# Patient Record
Sex: FEMALE | Race: WHITE | Employment: OTHER | URBAN - METROPOLITAN AREA
[De-identification: names, ages, dates, MRNs, and addresses within clinical notes are randomized per-mention and may not be internally consistent; named-entity substitution may affect disease eponyms.]

---

## 2017-06-07 ENCOUNTER — OFFICE VISIT (OUTPATIENT)
Dept: ORTHOPEDIC SURGERY | Age: 52
End: 2017-06-07

## 2017-06-07 VITALS — WEIGHT: 146 LBS | HEIGHT: 66 IN | BODY MASS INDEX: 23.46 KG/M2

## 2017-06-07 DIAGNOSIS — M17.11 ARTHRITIS OF RIGHT KNEE: Primary | ICD-10-CM

## 2017-06-07 DIAGNOSIS — S83.241A ACUTE MEDIAL MENISCAL TEAR, RIGHT, INITIAL ENCOUNTER: ICD-10-CM

## 2017-06-07 PROCEDURE — 99213 OFFICE O/P EST LOW 20 MIN: CPT | Performed by: ORTHOPAEDIC SURGERY

## 2017-06-21 ENCOUNTER — OFFICE VISIT (OUTPATIENT)
Dept: ORTHOPEDIC SURGERY | Age: 52
End: 2017-06-21

## 2017-06-21 VITALS — WEIGHT: 135 LBS | HEIGHT: 66 IN | BODY MASS INDEX: 21.69 KG/M2

## 2017-06-21 DIAGNOSIS — M17.11 PRIMARY OSTEOARTHRITIS OF RIGHT KNEE: Primary | ICD-10-CM

## 2017-06-21 PROCEDURE — 99213 OFFICE O/P EST LOW 20 MIN: CPT | Performed by: ORTHOPAEDIC SURGERY

## 2017-06-21 RX ORDER — CYCLOBENZAPRINE HCL 10 MG
TABLET ORAL
Refills: 3 | COMMUNITY
Start: 2017-06-14 | End: 2017-12-13

## 2017-06-21 RX ORDER — PROMETHAZINE HYDROCHLORIDE 25 MG/1
TABLET ORAL
Refills: 3 | COMMUNITY
Start: 2017-04-11

## 2017-06-21 RX ORDER — GABAPENTIN 300 MG/1
1200 CAPSULE ORAL NIGHTLY
COMMUNITY
Start: 2017-06-18 | End: 2018-12-10

## 2017-06-21 RX ORDER — DEXTROAMPHETAMINE SULFATE, DEXTROAMPHETAMINE SACCHARATE, AMPHETAMINE SULFATE AND AMPHETAMINE ASPARTATE 7.5; 7.5; 7.5; 7.5 MG/1; MG/1; MG/1; MG/1
CAPSULE, EXTENDED RELEASE ORAL
Refills: 0 | Status: ON HOLD | COMMUNITY
Start: 2017-05-22 | End: 2017-12-06

## 2017-06-21 RX ORDER — CELECOXIB 200 MG/1
CAPSULE ORAL
Refills: 3 | COMMUNITY
Start: 2017-06-09 | End: 2017-12-13

## 2017-06-30 ENCOUNTER — TELEPHONE (OUTPATIENT)
Dept: ORTHOPEDIC SURGERY | Age: 52
End: 2017-06-30

## 2017-07-06 ENCOUNTER — TELEPHONE (OUTPATIENT)
Dept: ORTHOPEDIC SURGERY | Age: 52
End: 2017-07-06

## 2017-07-21 ENCOUNTER — HOSPITAL ENCOUNTER (OUTPATIENT)
Dept: SURGERY | Age: 52
Discharge: HOME | End: 2017-07-21
Attending: INTERNAL MEDICINE | Admitting: INTERNAL MEDICINE

## 2017-07-21 VITALS
OXYGEN SATURATION: 99 % | BODY MASS INDEX: 22.5 KG/M2 | HEIGHT: 66 IN | RESPIRATION RATE: 18 BRPM | WEIGHT: 140 LBS | TEMPERATURE: 98.3 F | SYSTOLIC BLOOD PRESSURE: 99 MMHG | DIASTOLIC BLOOD PRESSURE: 78 MMHG | HEART RATE: 65 BPM

## 2017-07-21 DIAGNOSIS — K59.00 CONSTIPATION: ICD-10-CM

## 2017-07-21 RX ORDER — SODIUM CHLORIDE, SODIUM LACTATE, POTASSIUM CHLORIDE, CALCIUM CHLORIDE 600; 310; 30; 20 MG/100ML; MG/100ML; MG/100ML; MG/100ML
INJECTION, SOLUTION INTRAVENOUS ONCE
Status: COMPLETED | OUTPATIENT
Start: 2017-07-21 | End: 2017-07-21

## 2017-07-21 RX ORDER — PANTOPRAZOLE SODIUM 40 MG/1
40 TABLET, DELAYED RELEASE ORAL DAILY
COMMUNITY
End: 2017-12-13

## 2017-07-21 RX ORDER — LIDOCAINE HYDROCHLORIDE 10 MG/ML
0.1 INJECTION, SOLUTION EPIDURAL; INFILTRATION; INTRACAUDAL; PERINEURAL
Status: ACTIVE | OUTPATIENT
Start: 2017-07-21 | End: 2017-07-21

## 2017-07-21 RX ADMIN — SODIUM CHLORIDE, SODIUM LACTATE, POTASSIUM CHLORIDE, CALCIUM CHLORIDE: 600; 310; 30; 20 INJECTION, SOLUTION INTRAVENOUS at 09:59

## 2017-07-21 ASSESSMENT — PAIN SCALES - GENERAL: PAINLEVEL_OUTOF10: 0

## 2017-07-21 ASSESSMENT — PAIN - FUNCTIONAL ASSESSMENT: PAIN_FUNCTIONAL_ASSESSMENT: 0-10

## 2017-07-27 ENCOUNTER — NURSE ONLY (OUTPATIENT)
Dept: ORTHOPEDIC SURGERY | Age: 52
End: 2017-07-27

## 2017-07-27 VITALS — BODY MASS INDEX: 21.69 KG/M2 | WEIGHT: 135 LBS | HEIGHT: 66 IN

## 2017-07-27 DIAGNOSIS — M17.11 PRIMARY OSTEOARTHRITIS OF RIGHT KNEE: Primary | ICD-10-CM

## 2017-07-27 PROCEDURE — 20610 DRAIN/INJ JOINT/BURSA W/O US: CPT | Performed by: PHYSICIAN ASSISTANT

## 2017-08-24 ENCOUNTER — NURSE ONLY (OUTPATIENT)
Dept: ORTHOPEDIC SURGERY | Age: 52
End: 2017-08-24

## 2017-08-24 VITALS — HEIGHT: 66 IN | BODY MASS INDEX: 21.69 KG/M2 | WEIGHT: 135 LBS

## 2017-08-24 DIAGNOSIS — M17.11 PRIMARY OSTEOARTHRITIS OF RIGHT KNEE: Primary | ICD-10-CM

## 2017-08-25 ENCOUNTER — NURSE ONLY (OUTPATIENT)
Dept: ORTHOPEDIC SURGERY | Age: 52
End: 2017-08-25

## 2017-08-25 VITALS — WEIGHT: 135 LBS | BODY MASS INDEX: 21.69 KG/M2 | HEIGHT: 66 IN

## 2017-08-25 DIAGNOSIS — M17.11 PRIMARY OSTEOARTHRITIS OF RIGHT KNEE: Primary | ICD-10-CM

## 2017-08-25 PROCEDURE — 20610 DRAIN/INJ JOINT/BURSA W/O US: CPT | Performed by: PHYSICIAN ASSISTANT

## 2017-09-08 ENCOUNTER — NURSE ONLY (OUTPATIENT)
Dept: ORTHOPEDIC SURGERY | Age: 52
End: 2017-09-08

## 2017-09-08 VITALS — WEIGHT: 135 LBS | HEIGHT: 66 IN | BODY MASS INDEX: 21.69 KG/M2

## 2017-09-08 DIAGNOSIS — M17.11 PRIMARY OSTEOARTHRITIS OF RIGHT KNEE: Primary | ICD-10-CM

## 2017-09-08 PROCEDURE — 20610 DRAIN/INJ JOINT/BURSA W/O US: CPT | Performed by: PHYSICIAN ASSISTANT

## 2017-10-26 ENCOUNTER — OFFICE VISIT (OUTPATIENT)
Dept: ORTHOPEDIC SURGERY | Age: 52
End: 2017-10-26

## 2017-10-26 VITALS
DIASTOLIC BLOOD PRESSURE: 71 MMHG | HEIGHT: 66 IN | BODY MASS INDEX: 21.69 KG/M2 | SYSTOLIC BLOOD PRESSURE: 134 MMHG | WEIGHT: 135 LBS | HEART RATE: 84 BPM

## 2017-10-26 DIAGNOSIS — M17.11 PRIMARY OSTEOARTHRITIS OF RIGHT KNEE: Primary | ICD-10-CM

## 2017-10-26 PROCEDURE — 99213 OFFICE O/P EST LOW 20 MIN: CPT | Performed by: PHYSICIAN ASSISTANT

## 2017-10-26 PROCEDURE — 73564 X-RAY EXAM KNEE 4 OR MORE: CPT | Performed by: PHYSICIAN ASSISTANT

## 2017-10-26 NOTE — PROGRESS NOTES
Chief Complaint:  Knee Pain (RIGHT     FINISHED EUFLEXXA LAST VISIT)      History of Present Illness:  Nohemi Russell is a 46 y.o. female here regarding right knee pain. The patient is currently ambulating independently. The pain is located medial, lateral. She describes the symptoms as aching and sharp. Symptoms improve with nothing. The symptoms are made worse with activity, weight bearing. Patient reports she has been having problems with her knees for many years. She was seen in the office in Aug 2016 where she had her knee aspirated and injected with cortisone, which seems to help for a short amount of time, maybe a couple months. She continued to get pain and swelling. She currently takes Celebrex daily and will occasionally take Advil as well. She has tried OTC pain medication including tylenol with no relief. She reports her knee pain is now starting to keep her up at night. She does have a history of 2 knee arthroscopies in this right knee done by Dr. Francies Kayser. She recently finished a series 3 Euflex injections with essentially no relief. She has tried numerous conservative treatments including a directed home exercise program as well as some physical therapy in the past as well. She is not having any relief and is interested in replacing her knee at this point      Medical History:  Patient's medications, allergies, past medical, surgical, social and family histories were reviewed and updated as appropriate. Review of Systems:  Pertinent items are noted in HPI  Review of systems reviewed from Patient History Form and available in the patient's chart under the Media tab. Vital Signs:  Vitals:    10/26/17 1418   BP: 134/71   Pulse: 84       Pain Assessment:            General Exam:   Constitutional: Patient is adequately groomed with no evidence of malnutrition  Mental Status: The patient is oriented to time, place and person. The patient's mood and affect are appropriate.   Vascular:

## 2017-10-26 NOTE — LETTER
[facility]  Surgery Precert & Billing Form:    DEMOGRAPHICS:                                                                                                       Patient Name:  Regis Phalen  Patient :  1965   Patient SS#:      Patient Phone:  [phone] (home) [phone] (work) Alt.  Patient Phone:    Patient Address:  [facility] [address] [id number]    PCP:  Yue Chaudhary MD  Insurance: [facility]    DIAGNOSIS & PROCEDURE:                                                                                      Diagnosis: M17.11  Operation: right    SURGERY  INFORMATION  Date of Surgery:   17  Location:    [facility]  Type:    Inpatient  23 hour hold:  No  Surgeon:         Javi Bravo MD  10/26/17     BILLING INFORMATION:                                                                                                Physician Procedure                                            CPT Codes                      PA, or Fellow Procedure                                      CPT Codes                      Precert information:

## 2017-10-26 NOTE — ADDENDUM NOTE
Encounter addended by: Olvin Ambrosio on: 10/26/2017  4:05 PM<BR>    Actions taken: Letter status changed

## 2017-10-31 ENCOUNTER — TELEPHONE (OUTPATIENT)
Dept: ORTHOPEDIC SURGERY | Age: 52
End: 2017-10-31

## 2017-10-31 NOTE — ADDENDUM NOTE
Encounter addended by: Kerry Organ on: 10/31/2017  4:43 PM<BR>    Actions taken: Letter status changed

## 2017-11-06 ENCOUNTER — CASE MANAGEMENT (OUTPATIENT)
Dept: CASE MANAGEMENT | Age: 52
End: 2017-11-06

## 2017-11-06 NOTE — CARE COORDINATION
Patient scheduled for surgery 11/21/17, nurse navigator called patient to make sure visit with primary care physician for pre-op visit was scheduled and pre-op lab work was scheduled. [place] [id number] at [facility]. PCP Scheduled: 11/10/17     Lab work schedule: 11/10/17    Equipment patient has at home: None- to ask friends for equipments    Discharge plans: Home    Who will care for patient after discharge:     OrthoVitals:NO    Nurse navigator instructed patient to call with any questions or concerns, will contact patient following PCP visit.     Camille Mccall  Orthopedic Nurse Navigator  Phone number [phone]

## 2017-11-14 ENCOUNTER — HOSPITAL ENCOUNTER (OUTPATIENT)
Dept: OTHER | Age: 52
Discharge: HOME | End: 2017-11-14
Attending: ORTHOPAEDIC SURGERY | Admitting: ORTHOPAEDIC SURGERY

## 2017-11-14 LAB
ABO/RH: NORMAL
ANION GAP SERPL CALCULATED.3IONS-SCNC: 17 MMOL/L (ref 3–16)
ANTIBODY SCREEN: NORMAL
APTT: 29.9 SEC (ref 24.1–34.9)
BASOPHILS ABSOLUTE: 0.1 K/UL (ref 0–0.2)
BASOPHILS RELATIVE PERCENT: 1.1 %
BILIRUBIN URINE: NEGATIVE
BLOOD, URINE: NEGATIVE
BUN BLDV-MCNC: 6 MG/DL (ref 7–20)
CALCIUM SERPL-MCNC: 9.5 MG/DL (ref 8.3–10.6)
CHLORIDE BLD-SCNC: 97 MMOL/L (ref 99–110)
CLARITY: CLEAR
CO2: 26 MMOL/L (ref 21–32)
COLOR: YELLOW
CREAT SERPL-MCNC: 0.9 MG/DL (ref 0.6–1.1)
EOSINOPHILS ABSOLUTE: 0.1 K/UL (ref 0–0.6)
EOSINOPHILS RELATIVE PERCENT: 1.7 %
GFR AFRICAN AMERICAN: >60
GFR NON-AFRICAN AMERICAN: >60
GLUCOSE BLD-MCNC: 66 MG/DL (ref 70–99)
GLUCOSE URINE: NEGATIVE MG/DL
HCT VFR BLD CALC: 51.1 % (ref 36–48)
HEMOGLOBIN: 17 G/DL (ref 12–16)
INR BLD: 0.93 (ref 0.85–1.15)
KETONES, URINE: NEGATIVE MG/DL
LEUKOCYTE ESTERASE, URINE: NEGATIVE
LYMPHOCYTES ABSOLUTE: 3.2 K/UL (ref 1–5.1)
LYMPHOCYTES RELATIVE PERCENT: 41.6 %
MCH RBC QN AUTO: 31.1 PG (ref 26–34)
MCHC RBC AUTO-ENTMCNC: 33.3 G/DL (ref 31–36)
MCV RBC AUTO: 93.4 FL (ref 80–100)
MICROSCOPIC EXAMINATION: NORMAL
MONOCYTES ABSOLUTE: 0.6 K/UL (ref 0–1.3)
MONOCYTES RELATIVE PERCENT: 7.7 %
NEUTROPHILS ABSOLUTE: 3.7 K/UL (ref 1.7–7.7)
NEUTROPHILS RELATIVE PERCENT: 47.9 %
NITRITE, URINE: NEGATIVE
PDW BLD-RTO: 13.8 % (ref 12.4–15.4)
PH UA: 6.5
PLATELET # BLD: 265 K/UL (ref 135–450)
PMV BLD AUTO: 9 FL (ref 5–10.5)
POTASSIUM SERPL-SCNC: 4.3 MMOL/L (ref 3.5–5.1)
PROTEIN UA: NEGATIVE MG/DL
PROTHROMBIN TIME: 10.5 SEC (ref 9.6–13)
RBC # BLD: 5.47 M/UL (ref 4–5.2)
SODIUM BLD-SCNC: 140 MMOL/L (ref 136–145)
SPECIFIC GRAVITY UA: 1.01
URINE TYPE: NORMAL
UROBILINOGEN, URINE: 0.2 E.U./DL
WBC # BLD: 7.6 K/UL (ref 4–11)

## 2017-11-15 LAB
EKG ATRIAL RATE: 71 BPM
EKG DIAGNOSIS: NORMAL
EKG P AXIS: 58 DEGREES
EKG P-R INTERVAL: 158 MS
EKG Q-T INTERVAL: 390 MS
EKG QRS DURATION: 80 MS
EKG QTC CALCULATION (BAZETT): 423 MS
EKG R AXIS: 57 DEGREES
EKG T AXIS: 58 DEGREES
EKG VENTRICULAR RATE: 71 BPM
URINE CULTURE, ROUTINE: NORMAL

## 2017-11-15 PROCEDURE — 93010 ELECTROCARDIOGRAM REPORT: CPT | Performed by: INTERNAL MEDICINE

## 2017-11-21 PROBLEM — M17.11 ARTHRITIS OF RIGHT KNEE: Status: ACTIVE | Noted: 2017-11-21

## 2017-11-21 PROBLEM — Z96.651 STATUS POST TOTAL RIGHT KNEE REPLACEMENT: Status: ACTIVE | Noted: 2017-11-21

## 2017-11-22 ENCOUNTER — CASE MANAGEMENT (OUTPATIENT)
Dept: CASE MANAGEMENT | Age: 52
End: 2017-11-22

## 2017-11-27 ENCOUNTER — TELEPHONE (OUTPATIENT)
Dept: ORTHOPEDIC SURGERY | Age: 52
End: 2017-11-27

## 2017-11-27 NOTE — TELEPHONE ENCOUNTER
Spoke with pt. Incision status: No redness, some swelling, or drainage. Pt sent picture was concerned about swelling in one spot. Edema/Swelling/Teds: Having some swelling on the left side of the knee. Recommended wearing TEDS daily, TEDS are drying. Using ice machine daily    Pain level and status: Pain tolerable, had rough morning. Use of pain medications: Trying to take only 1 pill every four hours. Sometimes ok, but needs to wake up to take pain pill through the night. Afraid to take them because she will run out. Spoke with pt about the need to take pain medication as prescribed and it is ok to take 2 pills    Bowels: Moving ok    [address]  active: [employer], PT to come later today.       Outpatient therapy: N/A    Do you have all of your medications: Yes    Changes in medications: No    Ortho Vitals: N/A    Follow up appointments:    Future Appointments  Date Time Provider Luciana Carreon   12/4/2017 2:30 PM [place] Daylene Shape, MD [facility]

## 2017-11-30 RX ORDER — OXYCODONE HYDROCHLORIDE AND ACETAMINOPHEN 5; 325 MG/1; MG/1
1 TABLET ORAL EVERY 6 HOURS PRN
Qty: 28 TABLET | Refills: 0 | Status: SHIPPED | OUTPATIENT
Start: 2017-11-30 | End: 2017-12-18 | Stop reason: SDUPTHER

## 2017-12-01 RX ORDER — OXYCODONE HYDROCHLORIDE AND ACETAMINOPHEN 5; 325 MG/1; MG/1
TABLET ORAL
Qty: 21 TABLET | Refills: 0 | Status: SHIPPED | OUTPATIENT
Start: 2017-12-01 | End: 2017-12-18 | Stop reason: SDUPTHER

## 2017-12-01 RX ORDER — OXYCODONE HYDROCHLORIDE AND ACETAMINOPHEN 5; 325 MG/1; MG/1
1 TABLET ORAL EVERY 6 HOURS PRN
Qty: 21 TABLET | Refills: 0 | Status: SHIPPED | OUTPATIENT
Start: 2017-12-01 | End: 2017-12-01 | Stop reason: CLARIF

## 2017-12-01 RX ORDER — OXYCODONE HYDROCHLORIDE AND ACETAMINOPHEN 5; 325 MG/1; MG/1
TABLET ORAL
Qty: 21 TABLET | Refills: 0 | OUTPATIENT
Start: 2017-12-01

## 2017-12-02 PROBLEM — I63.9 ACUTE CVA (CEREBROVASCULAR ACCIDENT) (HCC): Status: ACTIVE | Noted: 2017-12-02

## 2017-12-02 PROBLEM — D75.839 THROMBOCYTOSIS: Status: ACTIVE | Noted: 2017-12-02

## 2017-12-02 PROBLEM — I65.22 ICAO (INTERNAL CAROTID ARTERY OCCLUSION), LEFT: Status: ACTIVE | Noted: 2017-12-02

## 2017-12-08 ENCOUNTER — HOSPITAL ENCOUNTER (OUTPATIENT)
Dept: OTHER | Age: 52
Discharge: HOME | End: 2017-12-31
Attending: INTERNAL MEDICINE | Admitting: INTERNAL MEDICINE

## 2017-12-08 ENCOUNTER — ANTI-COAG VISIT (OUTPATIENT)
Dept: PHARMACY | Facility: CLINIC | Age: 52
End: 2017-12-08

## 2017-12-08 LAB — INR BLD: 4.6

## 2017-12-08 NOTE — PROGRESS NOTES
Mrs. Valencia Contreras is here for anticoagulation management for TIA involving left carotid artery  Pt presents w/o complaint. Pt verifies dosing regimen as listed above. No bleeding/bruising/swelling/SOB. No BRBPR. No melena. No changes OTC/herbal medications. .  She normally has 2-3 servings of greens/week. Pt does not drink ETOH and stopped smoking due to CVA; patient on nicotine patches currently.     INR 1.69 at discharge - pt is to continue anticoagulation for at least 3 months per vascular. MD notes repeat CTA in 3 months    Currently has had 3 doses (5,5,2.5) mg as of 12/8; d/c 12/6 w/ lovenox bridge; was on lovenox since late nov for TKA    INR 4.6 is above the therapeutic range of 2.5-3 per [place] Abdiwenedina 12/6/2017  Recommended to hold lovenox; hold warfarin Fri & Sat; 2.5mg on Sun  Pt has the 5mg tablets. Will continue to monitor and check INR in 3 days  Dosing reminder card given with phone number, appointment date and time.    Return to clinic: 12/11 (mon) @ 10:45am     Lc QUEVEDO Candidate 2018

## 2017-12-11 ENCOUNTER — ANTI-COAG VISIT (OUTPATIENT)
Dept: PHARMACY | Facility: CLINIC | Age: 52
End: 2017-12-11

## 2017-12-11 DIAGNOSIS — G45.1 TIA INVOLVING LEFT INTERNAL CAROTID ARTERY: ICD-10-CM

## 2017-12-11 DIAGNOSIS — I63.9 ACUTE CVA (CEREBROVASCULAR ACCIDENT) (HCC): ICD-10-CM

## 2017-12-11 LAB — INR BLD: 1.6

## 2017-12-13 ENCOUNTER — ANTI-COAG VISIT (OUTPATIENT)
Dept: PHARMACY | Facility: CLINIC | Age: 52
End: 2017-12-13

## 2017-12-13 PROBLEM — G45.9 TIA (TRANSIENT ISCHEMIC ATTACK): Status: ACTIVE | Noted: 2017-12-13

## 2017-12-13 LAB — INR BLD: 2.6

## 2017-12-15 ENCOUNTER — TELEPHONE (OUTPATIENT)
Dept: ORTHOPEDIC SURGERY | Age: 52
End: 2017-12-15

## 2017-12-18 RX ORDER — OXYCODONE HYDROCHLORIDE AND ACETAMINOPHEN 5; 325 MG/1; MG/1
1 TABLET ORAL EVERY 6 HOURS PRN
Qty: 28 TABLET | Refills: 0 | Status: SHIPPED | OUTPATIENT
Start: 2017-12-18 | End: 2017-12-28 | Stop reason: SDUPTHER

## 2017-12-21 ENCOUNTER — TELEPHONE (OUTPATIENT)
Dept: ORTHOPEDIC SURGERY | Age: 52
End: 2017-12-21

## 2017-12-21 NOTE — TELEPHONE ENCOUNTER
Called pt to follow up from previous admissions. Pt having several new appts from new TIA's post op. Left Voicemail for pt.

## 2017-12-28 RX ORDER — OXYCODONE HYDROCHLORIDE AND ACETAMINOPHEN 5; 325 MG/1; MG/1
1 TABLET ORAL EVERY 6 HOURS PRN
OUTPATIENT
Start: 2017-12-28 | End: 2018-01-04

## 2017-12-28 RX ORDER — OXYCODONE HYDROCHLORIDE AND ACETAMINOPHEN 5; 325 MG/1; MG/1
1 TABLET ORAL EVERY 6 HOURS PRN
Qty: 28 TABLET | Refills: 0 | Status: SHIPPED | OUTPATIENT
Start: 2017-12-28 | End: 2018-01-04

## 2018-01-01 ENCOUNTER — HOSPITAL ENCOUNTER (OUTPATIENT)
Dept: OTHER | Age: 53
Discharge: HOME | End: 2018-01-31
Attending: INTERNAL MEDICINE | Admitting: INTERNAL MEDICINE

## 2018-01-02 ENCOUNTER — CASE MANAGEMENT (OUTPATIENT)
Dept: ORTHOPEDIC SURGERY | Age: 53
End: 2018-01-02

## 2018-01-08 ENCOUNTER — OFFICE VISIT (OUTPATIENT)
Dept: ORTHOPEDIC SURGERY | Age: 53
End: 2018-01-08

## 2018-01-08 ENCOUNTER — CASE MANAGEMENT (OUTPATIENT)
Dept: ORTHOPEDIC SURGERY | Age: 53
End: 2018-01-08

## 2018-01-08 VITALS
HEIGHT: 66 IN | HEART RATE: 76 BPM | DIASTOLIC BLOOD PRESSURE: 70 MMHG | WEIGHT: 143.08 LBS | BODY MASS INDEX: 22.99 KG/M2 | SYSTOLIC BLOOD PRESSURE: 117 MMHG

## 2018-01-08 DIAGNOSIS — Z96.651 HISTORY OF TOTAL KNEE ARTHROPLASTY, RIGHT: ICD-10-CM

## 2018-01-08 DIAGNOSIS — M25.561 RIGHT KNEE PAIN, UNSPECIFIED CHRONICITY: Primary | ICD-10-CM

## 2018-01-08 PROCEDURE — 99024 POSTOP FOLLOW-UP VISIT: CPT | Performed by: ORTHOPAEDIC SURGERY

## 2018-01-08 PROCEDURE — 73560 X-RAY EXAM OF KNEE 1 OR 2: CPT | Performed by: ORTHOPAEDIC SURGERY

## 2018-01-08 RX ORDER — DOXYCYCLINE 100 MG/1
TABLET ORAL
Refills: 0 | COMMUNITY
Start: 2017-10-04 | End: 2018-12-10

## 2018-01-08 RX ORDER — ESTERIFIED ESTROGEN AND METHYLTESTOSTERONE 1.25; 2.5 MG/1; MG/1
1 TABLET ORAL
COMMUNITY

## 2018-01-08 RX ORDER — HYDROCODONE BITARTRATE AND ACETAMINOPHEN 7.5; 325 MG/1; MG/1
1 TABLET ORAL
COMMUNITY
Start: 2017-12-20 | End: 2018-12-10

## 2018-01-08 RX ORDER — ATORVASTATIN CALCIUM 40 MG/1
40 TABLET, FILM COATED ORAL
COMMUNITY
End: 2018-12-10

## 2018-01-08 RX ORDER — GABAPENTIN 300 MG/1
1200 CAPSULE ORAL NIGHTLY
COMMUNITY

## 2018-01-08 RX ORDER — CLOPIDOGREL BISULFATE 75 MG/1
75 TABLET ORAL
COMMUNITY
End: 2018-12-10

## 2018-01-08 RX ORDER — CYCLOBENZAPRINE HCL 10 MG
TABLET ORAL
Refills: 2 | COMMUNITY
Start: 2017-12-29 | End: 2018-12-10

## 2018-01-08 RX ORDER — CLOPIDOGREL BISULFATE 75 MG/1
TABLET ORAL
Refills: 0 | COMMUNITY
Start: 2017-12-18 | End: 2018-12-10

## 2018-01-08 RX ORDER — TEMAZEPAM 15 MG/1
CAPSULE ORAL
Refills: 2 | COMMUNITY
Start: 2017-12-29 | End: 2018-12-10

## 2018-01-08 RX ORDER — CYCLOBENZAPRINE HCL 10 MG
10 TABLET ORAL
COMMUNITY

## 2018-01-08 RX ORDER — PANTOPRAZOLE SODIUM 40 MG/1
40 TABLET, DELAYED RELEASE ORAL
COMMUNITY

## 2018-01-08 RX ORDER — PANTOPRAZOLE SODIUM 40 MG/1
TABLET, DELAYED RELEASE ORAL
COMMUNITY
Start: 2018-01-05 | End: 2018-12-10

## 2018-01-08 RX ORDER — DEXTROAMPHETAMINE SACCHARATE, AMPHETAMINE ASPARTATE, DEXTROAMPHETAMINE SULFATE AND AMPHETAMINE SULFATE 2.5; 2.5; 2.5; 2.5 MG/1; MG/1; MG/1; MG/1
30 TABLET ORAL DAILY
COMMUNITY

## 2018-01-08 NOTE — PROGRESS NOTES
Chief Complaint  Post-Op Check (R Knee)    right knee status post total knee arthroplasty. Date of surgery: 11/21/17    History of Present Illness:  Baldomero Delarosa is a 46 y.o. female here for follow-up regarding their right knee status post total knee arthroplasty. Her postoperative course was complicated by what was initially felt to be a stroke. She was returned to the hospital and admitted therefore a CVA. Studies showed that there was an abnormality of the carotid artery and it was felt to be an operative. She was then discharged home. She had another episode which was similar to her previous CVA and then was seen at [facility].  At that hospital she did undergo stent procedure and apparently that condition has been treated. She apparently also has a cerebral aneurysm that's 5 mm in diameter and is being watched. Throughout all this she's not been able to have any postoperative outpatient or inpatient physical therapy for her right knee. Medical History  Patient's medications, allergies, past medical, surgical, social and family histories were reviewed and updated as appropriate. Review of Systems  Pertinent items are noted in HPI. Relevant review of systems reviewed and available in the patient's chart    Vital Signs  Vitals:    01/08/18 1035   BP: 117/70   Pulse: 76       Pain Assessment:            General Exam:   Constitutional: Patient is adequately groomed with no evidence of malnutrition  Mental Status: The patient is oriented to time, place and person. The patient's mood and affect are appropriate. Vascular: Examination reveals no swelling or calf tenderness. Peripheral pulses are palpable and 2+. Neurological: The patient has good coordination. There is no weakness or sensory deficit. Right Knee Examination  Inspection:  No gross deformities noted. Minimal swelling, no ecchymosis or erythema. Incision site is clean, dry, intact and well-healed.      Palpation:

## 2018-01-17 ENCOUNTER — HOSPITAL ENCOUNTER (OUTPATIENT)
Dept: PHYSICAL THERAPY | Age: 53
Discharge: HOME | End: 2018-01-31
Admitting: ORTHOPAEDIC SURGERY

## 2018-01-17 NOTE — FLOWSHEET NOTE
[facility]  Orthopaedics and Sports Rehabilitation, [facility]    Physical Therapy Daily Treatment Note  Date:  2018    Patient Name:  Argentina Uribe    :  1965  MRN: [id number]  Restrictions/Precautions:    Medical/Treatment Diagnosis Information:  · Diagnosis: s/p R knee TKA 2/2 R knee OA 17  · Treatment Diagnosis: Right knee pain, S06.123   Insurance/Certification information:  PT Insurance Information: [employer]  Physician Information:  Referring Practitioner: Dr. Glenny Cowart of care signed (Y/N):     Date of Patient follow up with Physician:     G-Code (if applicable):      Date G-Code Applied:      PT G-Codes  Functional Assessment Tool Used: LEFS  Score: = 89% disability  Functional Limitation: Mobility: Walking and moving around  Mobility: Walking and Moving Around Current Status (): At least 80 percent but less than 100 percent impaired, limited or restricted  Mobility: Walking and Moving Around Goal Status ():  At least 20 percent but less than 40 percent impaired, limited or restricted    Progress Note: [x]  Yes  []  No  Next due by: Visit #10       Latex Allergy:  [x]NO      []YES  Preferred Language for Healthcare:   [x]English       []other:    Visit # Insurance Allowable   1 Needs Auth     Pain level:  7-8/10     SUBJECTIVE:  See eval    OBJECTIVE: See eval  Observation:   Test measurements:      RESTRICTIONS/PRECAUTIONS: R knee TKA 17, s/p TIA and CVA in past 2 months, spinal stimulator    Exercises/Interventions:     Therapeutic Ex Sets/sec Reps Notes HEP   Calf stretch band 30\" 1     HS stretch long sitting 30\" 1     Heel prop  1x Reviewed    Heel slides band  10x     SLR  10x     SSLR  10x     LAQ  10x     Heel raise  10x     Mini squat  10x                      *add machines next visit pending pt progress    Pt education 10'  Safety with mobility, progression of PT/HEP, use of ice, not to push through pain with exercises- pt stated understanding eval    Treatment/Activity Tolerance:  [x] Patient tolerated treatment well [] Patient limited by fatique  [x] Patient limited by pain  [] Patient limited by other medical complications  [] Other:     Prognosis: [x] Good [] Fair  [] Poor    Patient Requires Follow-up: [x] Yes  [] No    PLAN: See eval  [] Continue per plan of care [] Alter current plan (see comments)  [x] Plan of care initiated [] Hold pending MD visit [] Discharge    Electronically signed by: Afua Coats PT

## 2018-02-01 ENCOUNTER — HOSPITAL ENCOUNTER (OUTPATIENT)
Dept: PHYSICAL THERAPY | Age: 53
Discharge: HOME | End: 2018-02-28
Attending: ORTHOPAEDIC SURGERY | Admitting: ORTHOPAEDIC SURGERY

## 2018-02-05 ENCOUNTER — HOSPITAL ENCOUNTER (OUTPATIENT)
Dept: PHYSICAL THERAPY | Age: 53
Discharge: HOME | End: 2018-02-06
Admitting: ORTHOPAEDIC SURGERY

## 2018-02-05 NOTE — FLOWSHEET NOTE
[facility]  Orthopaedics and Sports Rehabilitation, [facility]    Physical Therapy Daily Treatment Note  Date:  2018    Patient Name:  Chauncey Pfeiffer    :  1965  MRN: [id number]  Restrictions/Precautions:    Medical/Treatment Diagnosis Information:  · Diagnosis: s/p R knee TKA 2/2 R knee OA 17  · Treatment Diagnosis: Right knee pain, X93.960   Insurance/Certification information:  PT Insurance Information: [employer]  Physician Information:  Referring Practitioner: Dr. Agusto Garcia of care signed (Y/N):     Date of Patient follow up with Physician:     G-Code (if applicable):      Date G-Code Applied:      PT G-Codes  Functional Assessment Tool Used: LEFS  Score: = 89% disability  Functional Limitation: Mobility: Walking and moving around  Mobility: Walking and Moving Around Current Status (): At least 80 percent but less than 100 percent impaired, limited or restricted  Mobility: Walking and Moving Around Goal Status (): At least 20 percent but less than 40 percent impaired, limited or restricted    Progress Note: [x]  Yes  []  No  Next due by: Visit #10       Latex Allergy:  [x]NO      []YES  Preferred Language for Healthcare:   [x]English       []other:    Visit # Insurance Allowable   2 30     Pain level:  5/10     SUBJECTIVE:  Pt last presented to PT 18. Stated has difficulty finding ride to PT. Stated has been completing HEP 1-2x/day.     OBJECTIVE: See eval  Observation:   Test measurements: 18 Knee flexion 122,     RESTRICTIONS/PRECAUTIONS: R knee TKA 17, s/p TIA and CVA in past 2 months, spinal stimulator    Exercises/Interventions:     Therapeutic Ex Sets/sec Reps Notes HEP   Calf stretch band 30\" 1     HS stretch long sitting 30\" 1     Heel prop  1x Reviewed    Heel slides band  10x     SLR  10x     SSLR  10x     LAQ  10x     Heel raise  10x     Mini squat  10x     See other flow sheet                 *add machines next visit pending pt progress    Pt
Assessment Introduced machines today. C/o pain with [facility], reports arthritis in hip and knees. Did well with other ex.    Time Based Treatment 20 minutes     Electronically signed by: Maciel Villa, SISSY

## 2018-02-12 ENCOUNTER — HOSPITAL ENCOUNTER (OUTPATIENT)
Dept: PHYSICAL THERAPY | Age: 53
Discharge: HOME | End: 2018-02-13
Admitting: ORTHOPAEDIC SURGERY

## 2018-02-12 NOTE — FLOWSHEET NOTE
Physical Therapist Assistant Activity Sheet    Date:  2018    Patient Name:  Nic Whitney    :  1965  MRN: [id number]  Restrictions/Precautions:    Medical/Treatment Diagnosis Information:  ·   Diagnosis: s/p R knee TKA 2/2 R knee OA 17  ·   reatment Diagnosis: Right knee pain, M25.561   Physician Information:    Referring Practitioner: Dr. Tan Leung                                                DOS/DOI:                                                    Date: 18   Bike  5 min   Elliptical     Treadmill     Airdyne          Gastroc stretch     Soleus stretch     Hamstring stretch     ITB stretch     Hip Flexor stretch     Quad stretch     Adductor stretch          Weight Shifting sp                               fp                               tp     Lateral walking (with/w/o TB)          Balance: PEP/Harriet board                    SLS 10\" x 5 10\" x 5         Star excursion load/explode           Extremity reach UE/LE          Leg Press Jaden. 60# x 30 60# x 30                     Ecc.                       Inv. Calf Press Jaden. Ecc.                       Inv.          LATASHA   Flex                ABd 35# x 10 R/L 35# x 30 R/L              ADd                TKE 50# x 30 c 3\" hold 50# x 30 c 3\" hold              Ext          Steps  Up 6\" x 30                Up and Over                 Down                 Lateral                 Rotation          Squats:  Mini                    Wall                    BOSU          Lunges:  Lunge to Balance                    Balance to Lunge                    Walking          Knee Extension Bilat. 20# x 30 20# x 30                              Ecc.                                Inv. Hamstring Curls Bilat. 30# x 30 35# x 30                               Ecc.                                 Inv.          Soleus Press Bilat.                             Ecc.
Oscillations-Mobs:  G-I, II, III, IV (PA's, Inf., Post.)  [] (85650) Provided manual therapy to mobilize LE, proximal hip and/or LS spine soft tissue/joints for the purpose of modulating pain, promoting relaxation,  increasing ROM, reducing/eliminating soft tissue swelling/inflammation/restriction, improving soft tissue extensibility and allowing for proper ROM for normal function with self care, mobility, lifting and ambulation. Modalities:  Declined    Charges:  Timed Code Treatment Minutes: 30   Total Treatment Minutes: 30     [] EVAL  [x] EL(08847) x  2   [] IONTO  [] NMR (20395) x      [] VASO  [] Manual (14384) x       [] Other:  [] TA x       [] Mech Traction (84396)  [] ES(attended) (61306)      [] ES (un) (77960):     GOALS:   Patient stated goal: Walk without pain. Therapist goals for Patient:   Short Term Goals: To be achieved in: 2 weeks  1. Independent in HEP and progression per patient tolerance, in order to prevent re-injury. 2. Patient will have a decrease in pain to facilitate improvement in movement, function, and ADLs as indicated by Functional Deficits. Long Term Goals: To be achieved in: 12 weeks  1. Disability index score of 39% or less for the LEFS to assist with reaching prior level of function. 2. Patient will demonstrate increased AROM to 120 knee flexion to allow for proper joint functioning as indicated by patients Functional Deficits. 3. Patient will demonstrate an increase in Strength to good proximal hip strength and control, within 5lb HHD in LE to allow for proper functional mobility as indicated by patients Functional Deficits. 4. Patient will return to functional activities without increased symptoms or restriction. 5. Patient will walk 20 minutes with LRAD without increased symptoms or restriction, no LOB. Progression Towards Functional goals:  [x] Patient is progressing as expected towards functional goals listed.     [] Progression is slowed due to

## 2018-02-28 ENCOUNTER — HOSPITAL ENCOUNTER (OUTPATIENT)
Dept: VASCULAR LAB | Age: 53
Discharge: HOME | End: 2018-02-28
Attending: NEUROLOGICAL SURGERY | Admitting: NEUROLOGICAL SURGERY

## 2018-02-28 DIAGNOSIS — I65.22 OCCLUSION AND STENOSIS OF LEFT CAROTID ARTERY: ICD-10-CM

## 2018-03-01 ENCOUNTER — HOSPITAL ENCOUNTER (OUTPATIENT)
Dept: PHYSICAL THERAPY | Age: 53
Discharge: HOME | End: 2018-03-31
Attending: ORTHOPAEDIC SURGERY | Admitting: ORTHOPAEDIC SURGERY

## 2018-03-07 ENCOUNTER — HOSPITAL ENCOUNTER (OUTPATIENT)
Dept: PHYSICAL THERAPY | Age: 53
Discharge: HOME | End: 2018-03-08
Admitting: ORTHOPAEDIC SURGERY

## 2018-03-07 NOTE — FLOWSHEET NOTE
without increased symptoms or restriction, no LOB. Progression Towards Functional goals:  [x] Patient is progressing as expected towards functional goals listed. [] Progression is slowed due to complexities listed. [] Progression has been slowed due to co-morbidities. [] Plan just implemented, too soon to assess goals progression  [] Other:     ASSESSMENT:  Progressed exercises without issues, no increased symptoms throughout session fatigue at end of session. Pt asked for updated HEP with machine exercises on it, gave pt updated HEP.     Treatment/Activity Tolerance:  [x] Patient tolerated treatment well [] Patient limited by fatique  [x] Patient limited by pain  [] Patient limited by other medical complications  [] Other:     Prognosis: [x] Good [] Fair  [] Poor    Patient Requires Follow-up: [x] Yes  [] No    PLAN: See eval  [x] Continue per plan of care [] Alter current plan (see comments)  [] Plan of care initiated [] Hold pending MD visit [] Discharge    Electronically signed by: Afua Coats PT

## 2018-03-15 ENCOUNTER — OFFICE VISIT (OUTPATIENT)
Dept: ORTHOPEDIC SURGERY | Age: 53
End: 2018-03-15

## 2018-03-15 VITALS — BODY MASS INDEX: 23.82 KG/M2 | HEIGHT: 65 IN | WEIGHT: 143 LBS

## 2018-03-15 DIAGNOSIS — Z96.651 HISTORY OF TOTAL KNEE ARTHROPLASTY, RIGHT: Primary | ICD-10-CM

## 2018-03-15 PROCEDURE — 99213 OFFICE O/P EST LOW 20 MIN: CPT | Performed by: PHYSICIAN ASSISTANT

## 2018-03-15 NOTE — PROGRESS NOTES
Palpation:  Nontender to palpation. She has a small effusion today. Range of Motion:  Nearly full range of motion with knee flexion and extension    Strength: Improving strength noted    Special Tests:  Not tested today    Skin: There are no rashes, ulcerations or lesions. Gait: she is walking independently but with a slightly antalgic gait. Reflex: normal    Additional Examinations:  Left Lower Extremity: Examination of the left lower extremity does not show any tenderness, deformity or injury. Range of motion is unremarkable. There is no gross instability. There are no rashes, ulcerations or lesions. Strength and tone are normal.      None  Diagnostic Test Findings:  No new x-rays taken today      Assessment :  right knee status post total knee arthroplasty. Impression:  Encounter Diagnosis   Name Primary?  History of total knee arthroplasty, right Yes       Office Procedures:  No orders of the defined types were placed in this encounter. Treatment Plan: At this time the patient is doing very well overall. We encouraged her to continue with her home exercise program and she can stop PT at this time. She will follow-up with us in approximately 3 months and we can reevaluate her once again at that time. Jamaica Monroy PA-C, scribing for Dr. Jose E Peñaloza            This dictation was performed with a verbal recognition program [facility]) and it was checked for errors. It is possible that there are still dictated errors within this office note. If so, please bring any errors to my attention for an addendum. All efforts were made to ensure that this office note is accurate.

## 2018-04-01 ENCOUNTER — HOSPITAL ENCOUNTER (OUTPATIENT)
Dept: PHYSICAL THERAPY | Age: 53
Discharge: HOME | End: 2018-04-30
Attending: ORTHOPAEDIC SURGERY | Admitting: ORTHOPAEDIC SURGERY

## 2018-04-10 ENCOUNTER — HOSPITAL ENCOUNTER (OUTPATIENT)
Dept: CT IMAGING | Age: 53
Discharge: HOME | End: 2018-04-10
Attending: NEUROLOGICAL SURGERY | Admitting: NEUROLOGICAL SURGERY

## 2018-04-10 DIAGNOSIS — I67.1 CEREBRAL ANEURYSM, NONRUPTURED: ICD-10-CM

## 2018-04-10 DIAGNOSIS — I67.1 NONRUPTURED CEREBRAL ANEURYSM: ICD-10-CM

## 2018-08-07 ENCOUNTER — HOSPITAL ENCOUNTER (OUTPATIENT)
Dept: VASCULAR LAB | Age: 53
Discharge: HOME | End: 2018-08-07
Payer: COMMERCIAL

## 2018-08-07 PROCEDURE — 93880 EXTRACRANIAL BILAT STUDY: CPT

## 2018-10-09 ENCOUNTER — HOSPITAL ENCOUNTER (OUTPATIENT)
Dept: CT IMAGING | Age: 53
Discharge: HOME | End: 2018-10-09
Payer: COMMERCIAL

## 2018-10-09 DIAGNOSIS — I67.1 CEREBRAL ANEURYSM, NONRUPTURED: ICD-10-CM

## 2018-10-09 PROCEDURE — 6360000004 HC RX CONTRAST MEDICATION: Performed by: NEUROLOGICAL SURGERY

## 2018-10-09 PROCEDURE — 70496 CT ANGIOGRAPHY HEAD: CPT

## 2018-10-09 RX ADMIN — IOPAMIDOL 80 ML: 755 INJECTION, SOLUTION INTRAVENOUS at 07:49

## 2018-11-09 ENCOUNTER — HOSPITAL ENCOUNTER (OUTPATIENT)
Age: 53
Discharge: HOME | End: 2018-11-09
Payer: COMMERCIAL

## 2018-11-09 ENCOUNTER — HOSPITAL ENCOUNTER (OUTPATIENT)
Dept: GENERAL RADIOLOGY | Age: 53
Discharge: HOME | End: 2018-11-09
Payer: COMMERCIAL

## 2018-11-09 DIAGNOSIS — R05.9 COUGH: ICD-10-CM

## 2018-11-09 PROCEDURE — 71046 X-RAY EXAM CHEST 2 VIEWS: CPT

## 2018-12-10 ENCOUNTER — INITIAL CONSULT (OUTPATIENT)
Dept: NEUROLOGY | Age: 53
End: 2018-12-10
Payer: COMMERCIAL

## 2018-12-10 ENCOUNTER — HOSPITAL ENCOUNTER (OUTPATIENT)
Dept: VASCULAR LAB | Age: 53
Discharge: HOME | End: 2018-12-10
Payer: COMMERCIAL

## 2018-12-10 VITALS
OXYGEN SATURATION: 96 % | HEART RATE: 77 BPM | WEIGHT: 142 LBS | SYSTOLIC BLOOD PRESSURE: 114 MMHG | BODY MASS INDEX: 23.66 KG/M2 | DIASTOLIC BLOOD PRESSURE: 66 MMHG | HEIGHT: 65 IN

## 2018-12-10 DIAGNOSIS — I63.239 CAROTID ARTERY STENOSIS WITH CEREBRAL INFARCTION (HCC): ICD-10-CM

## 2018-12-10 DIAGNOSIS — Z87.891 FORMER CIGARETTE SMOKER: ICD-10-CM

## 2018-12-10 DIAGNOSIS — Z86.73 REMOTE HISTORY OF STROKE: ICD-10-CM

## 2018-12-10 DIAGNOSIS — G31.84 MCI (MILD COGNITIVE IMPAIRMENT): Primary | ICD-10-CM

## 2018-12-10 DIAGNOSIS — I67.1 CEREBRAL ANEURYSM, NONRUPTURED: ICD-10-CM

## 2018-12-10 PROCEDURE — 99204 OFFICE O/P NEW MOD 45 MIN: CPT | Performed by: PSYCHIATRY & NEUROLOGY

## 2018-12-10 PROCEDURE — 93880 EXTRACRANIAL BILAT STUDY: CPT

## 2018-12-10 NOTE — LETTER
 Wears dentures     Upper     Prior to Visit Medications    Medication Sig Taking? Authorizing Provider   estrogens, conjugated,-methyltestosterone (ESTRATEST) 1.25-2.5 MG per tablet Take 1 tablet by mouth. Yes Historical Provider, MD   gabapentin (NEURONTIN) 300 MG capsule Take 1,200 mg by mouth. Yes Historical Provider, MD   aspirin 325 MG EC tablet Take 325 mg by mouth Yes Historical Provider, MD   pantoprazole (PROTONIX) 40 MG tablet Take 40 mg by mouth Yes Historical Provider, MD   cyclobenzaprine (FLEXERIL) 10 MG tablet Take 10 mg by mouth Yes Historical Provider, MD   amphetamine-dextroamphetamine (ADDERALL, 10MG,) 10 MG tablet  Yes Historical Provider, MD   promethazine (PHENERGAN) 25 MG tablet TAKE 1 TABLET BY MOUTH 3 TIMES A DAY AS NEEDED Yes Historical Provider, MD     No Known Allergies  Social History   Substance Use Topics    Smoking status: Current Every Day Smoker     Packs/day: 0.50    Smokeless tobacco: Never Used      Comment: E-cig    Alcohol use No     Family History   Problem Relation Age of Onset    High Blood Pressure Mother     Heart Disease Maternal Grandfather      Past Surgical History:   Procedure Laterality Date    BACK SURGERY  1997     lumbar x2    CHOLECYSTECTOMY      HAND SURGERY Bilateral     thumb joint replacement    HYSTERECTOMY      KNEE ARTHROSCOPY Right     x2    KNEE SURGERY Right 11/21/2017    RIGHT TOTAL KNEE REPLACEMENT        ROS : A 10-12 system review of constitutional, cardiovascular, respiratory, musculoskeletal, endocrine, skin, SHEENT, genitourinary, psychiatric and neurologic systems was obtained and updated today and is unremarkable except as mentioned in my HPI        Exam:   Constitutional:   Vitals:    12/10/18 1007   BP: 114/66   Pulse: 77   SpO2: 96%   Weight: 142 lb (64.4 kg)   Height: 5' 5\" (1.651 m)       General appearance: well-nourished. Eye: No icterus. No blurring of optic disc. Neck: supple  Cardiovascular: No carotid bruit. No lower leg edema with good pulsation. Mental Status: Oriented to person, place, problem, and time. Fluent speech. Good fund of knowledge. Normal attention span and concentration. Cranial Nerves:   II: Visual fields: Full to confrontation  III: Pupils: equal, round, reactive to light  III,IV,VI: Extra Ocular Movements are intact. No nystagmus  V: Facial sensation is intact to pin prick and light touch  VII: Facial strength and movements: intact and symmetric. VIII: Hearing: Intact to finger rub bilaterally  IX: Palate elevation is symmetric  XI: Shoulder shrug is intact  XII: Tongue movements are normal  Musculoskeletal: 5/5 in all 4 extremities. Normal tone. Reflexes: Bilateral biceps 2/4, triceps 2/4, brachial radialis 2/4, knee 2/4 and ankle 2/4. Planters: flexor bilaterally. Coordination: no pronator drift, no dysmetria with FNF testing. No tremors. Sensation: normal to all modalities. Gait/Posture: steady      Medical decision making:  I personally reviewed and updated social history, past medical history, medications, allergy, surgical history, and family history as documented in the patient's electronic health records. Labs and/or neuroimaging and other test results reviewed and discussed with the patient. Reviewed notes from other physicians. Provided patient education regarding risk, benefits and treatment options as well as adherence to medication regimen and side effect from these medications. Assessment:   Diagnosis Orders   1. MCI (mild cognitive impairment)  TSH without Reflex    Vitamin B12 & Folate   2. Remote history of stroke     3. Cerebral aneurysm, nonruptured     4. Carotid artery stenosis with cerebral infarction ([facility] [address]     5. Former cigarette smoker         Plan:  Check B12, folate and TSH  ASA  Should consider Statin. Lifestyle adjustment, improving attention and concentration spans, cognitive therapy and behavior therapy were addressed today.

## 2018-12-10 NOTE — PROGRESS NOTES
The patient is a 48y.o. years old female who  was referred by Governor Rhiannon MD for consultation regarding memory loss. HPI:  The patient describes hx of episodic memory loss since 2017. Symptoms started after she was dx with left MCA stroke in 2017 secondary to left ICA dissection and stenosis. She is S/P left ICA stenting in 12-17. She has been seen by Christian. She describes episodic short term memory loss. Sx are waxing and waning but can be daily. Degree is mild to moderate so far. Sx are witnessed by her . She needs prompting from him weekly. She can forget details of recent conversation. She has occasional word finding difficulties. She can repeat her self. No LOC or LILLI. No seizures. She denies long term memory loss. She denies any FH of dementia. She is taking care of her finance. She denies any severe depression. She has chronic insomnia. She denies any trouble driving or getting lost in directions. She stopped smoking last  Year. She is on ASA daily. She is on HRT. She denies any of ETOH abuse. No recent head trauma or LOC. No hx of psychosis. Her last CTA showed small left MCA aneurysm. She can not have MRI brain due to [facility] stimulator. She denies any hx of VICTOR MANUEL. No other new sx today and other ROS was negative. Past Medical History:   Diagnosis Date    Acid reflux     ADD (attention deficit disorder)     Arthritis     Back pain     chronic    Nausea     PONV (postoperative nausea and vomiting)     Spinal cord stimulator status     left hip/back - pt does not use    Wears dentures     Upper     Prior to Visit Medications    Medication Sig Taking? Authorizing Provider   estrogens, conjugated,-methyltestosterone (ESTRATEST) 1.25-2.5 MG per tablet Take 1 tablet by mouth. Yes Historical Provider, MD   gabapentin (NEURONTIN) 300 MG capsule Take 1,200 mg by mouth.  Yes Historical Provider, MD   aspirin 325 MG EC tablet Take 325 mg by mouth Yes Historical Provider, MD touch  VII: Facial strength and movements: intact and symmetric. VIII: Hearing: Intact to finger rub bilaterally  IX: Palate elevation is symmetric  XI: Shoulder shrug is intact  XII: Tongue movements are normal  Musculoskeletal: 5/5 in all 4 extremities. Normal tone. Reflexes: Bilateral biceps 2/4, triceps 2/4, brachial radialis 2/4, knee 2/4 and ankle 2/4. Planters: flexor bilaterally. Coordination: no pronator drift, no dysmetria with FNF testing. No tremors. Sensation: normal to all modalities. Gait/Posture: steady      Medical decision making:  I personally reviewed and updated social history, past medical history, medications, allergy, surgical history, and family history as documented in the patient's electronic health records. Labs and/or neuroimaging and other test results reviewed and discussed with the patient. Reviewed notes from other physicians. Provided patient education regarding risk, benefits and treatment options as well as adherence to medication regimen and side effect from these medications. Assessment:   Diagnosis Orders   1. MCI (mild cognitive impairment)  TSH without Reflex    Vitamin B12 & Folate   2. Remote history of stroke     3. Cerebral aneurysm, nonruptured     4. Carotid artery stenosis with cerebral infarction ([facility] [address]     5. Former cigarette smoker         Plan:  Check B12, folate and TSH  ASA  Should consider Statin. Lifestyle adjustment, improving attention and concentration spans, cognitive therapy and behavior therapy were addressed today.   Driving precautions  Advised the patient to stop HRT due to increased risk of cerebrovascular disease  We'll consider neuropsychological evaluation if symptoms worsen  Advised the patient to taper gabapentin gradually  FU CTA head for her small aneurysm  Yearly CUS  FU with me if sx worsen

## 2019-01-07 ENCOUNTER — HOSPITAL ENCOUNTER (OUTPATIENT)
Age: 54
Discharge: HOME | End: 2019-01-07
Payer: COMMERCIAL

## 2019-01-07 LAB
FOLATE: 15.02 NG/ML (ref 4.78–24.2)
TSH SERPL DL<=0.05 MIU/L-ACNC: 2.3 UIU/ML (ref 0.27–4.2)
VITAMIN B-12: 311 PG/ML (ref 211–911)

## 2019-01-07 PROCEDURE — 82746 ASSAY OF FOLIC ACID SERUM: CPT

## 2019-01-07 PROCEDURE — 84443 ASSAY THYROID STIM HORMONE: CPT

## 2019-01-07 PROCEDURE — 82607 VITAMIN B-12: CPT

## 2019-01-07 PROCEDURE — 36415 COLL VENOUS BLD VENIPUNCTURE: CPT

## 2019-03-08 ENCOUNTER — OFFICE VISIT (OUTPATIENT)
Dept: ORTHOPEDIC SURGERY | Age: 54
End: 2019-03-08
Payer: COMMERCIAL

## 2019-03-08 VITALS — WEIGHT: 143 LBS | BODY MASS INDEX: 23.82 KG/M2 | HEIGHT: 65 IN

## 2019-03-08 DIAGNOSIS — Z96.651 HISTORY OF TOTAL KNEE ARTHROPLASTY, RIGHT: Primary | ICD-10-CM

## 2019-03-08 DIAGNOSIS — M25.551 RIGHT HIP PAIN: ICD-10-CM

## 2019-03-08 PROCEDURE — 99213 OFFICE O/P EST LOW 20 MIN: CPT | Performed by: PHYSICIAN ASSISTANT

## 2019-04-17 ENCOUNTER — HOSPITAL ENCOUNTER (OUTPATIENT)
Dept: CT IMAGING | Age: 54
Discharge: HOME | End: 2019-04-17
Payer: COMMERCIAL

## 2019-04-17 DIAGNOSIS — I67.1 CEREBRAL ANEURYSM, NONRUPTURED: ICD-10-CM

## 2019-04-17 PROCEDURE — 6360000004 HC RX CONTRAST MEDICATION: Performed by: NEUROLOGICAL SURGERY

## 2019-04-17 PROCEDURE — 70496 CT ANGIOGRAPHY HEAD: CPT

## 2019-04-17 RX ADMIN — IOPAMIDOL 75 ML: 755 INJECTION, SOLUTION INTRAVENOUS at 09:58

## 2019-04-26 ENCOUNTER — ANTI-COAG VISIT (OUTPATIENT)
Dept: PHARMACY | Age: 54
End: 2019-04-26

## 2019-04-26 PROBLEM — I63.9 ACUTE CVA (CEREBROVASCULAR ACCIDENT) (HCC): Status: RESOLVED | Noted: 2017-12-02 | Resolved: 2019-04-26

## 2019-06-25 ENCOUNTER — TELEPHONE (OUTPATIENT)
Dept: ORTHOPEDIC SURGERY | Age: 54
End: 2019-06-25

## 2019-06-25 NOTE — TELEPHONE ENCOUNTER
GAVE [facility] MEDICAL RECORDS 12/16 TO PRESENT TO ASAEL PARADA TO SCAN IN MRO TO OOD

## 2019-09-17 ENCOUNTER — HOSPITAL ENCOUNTER (OUTPATIENT)
Dept: WOMENS IMAGING | Age: 54
Discharge: HOME | End: 2019-09-17
Payer: COMMERCIAL

## 2019-09-17 DIAGNOSIS — Z12.31 ENCOUNTER FOR SCREENING MAMMOGRAM FOR BREAST CANCER: ICD-10-CM

## 2019-09-17 PROCEDURE — 77067 SCR MAMMO BI INCL CAD: CPT

## 2019-10-21 ENCOUNTER — HOSPITAL ENCOUNTER (OUTPATIENT)
Dept: CT IMAGING | Age: 54
Discharge: HOME | End: 2019-10-21
Payer: COMMERCIAL

## 2019-10-21 DIAGNOSIS — I67.1 CEREBRAL ANEURYSM, NONRUPTURED: ICD-10-CM

## 2019-10-21 PROCEDURE — 70496 CT ANGIOGRAPHY HEAD: CPT

## 2019-10-21 PROCEDURE — 6360000004 HC RX CONTRAST MEDICATION: Performed by: NEUROLOGICAL SURGERY

## 2019-10-21 RX ADMIN — IOPAMIDOL 75 ML: 755 INJECTION, SOLUTION INTRAVENOUS at 10:44

## 2020-09-18 ENCOUNTER — HOSPITAL ENCOUNTER (OUTPATIENT)
Dept: WOMENS IMAGING | Age: 55
Discharge: HOME | End: 2020-09-18
Payer: COMMERCIAL

## 2020-09-18 PROCEDURE — 77067 SCR MAMMO BI INCL CAD: CPT

## 2020-11-05 ENCOUNTER — HOSPITAL ENCOUNTER (OUTPATIENT)
Dept: CT IMAGING | Age: 55
Discharge: HOME | End: 2020-11-05
Payer: COMMERCIAL

## 2020-11-05 PROCEDURE — 70496 CT ANGIOGRAPHY HEAD: CPT

## 2020-11-05 PROCEDURE — 6360000004 HC RX CONTRAST MEDICATION: Performed by: NEUROLOGICAL SURGERY

## 2020-11-05 PROCEDURE — 70498 CT ANGIOGRAPHY NECK: CPT

## 2020-11-05 RX ADMIN — IOPAMIDOL 75 ML: 755 INJECTION, SOLUTION INTRAVENOUS at 09:34

## 2021-04-28 NOTE — PROGRESS NOTES
[address] [facility]   Cardiovascular Evaluation    PATIENT: Alfredo Pretty  DATE: 2021  MRN: [id number]  CSN: [id number]  : 1965      Primary Care Doctor: Venice Byrne MD  Reason for evaluation:   New Patient, Palpitations, Shortness of Breath (on exertion), and Edema (bilateral feet and hands)      Subjective:   History of present illness on initial date of evaluation:   Alfredo Pretty is a 54 y.o. patient who presents referred by her PCP, Dr. Lashawn Verde, for concerns of palpitations and dyspnea. Today she reports she is getting dyspnea with exertion. States it does not take much exertion to become short of breath. She states this is new. She denies any chest pain, dizziness or syncope. She states she does have swelling in her BLE. She states she does feel palpitations. Not daily, but a few times a week. States it feels like her heart is skipping beats. Does not last.  She states she is currently using a nicotine patch, but does take it off at times to smoke. Patient Active Problem List   Diagnosis    Arthritis of right knee    Status post total right knee replacement    ICAO (internal carotid artery occlusion), left    Thrombocytosis (HCC)    Expressive aphasia    Transient cerebral ischemia    Smoking    Carotid occlusion, left    TIA (transient ischemic attack)    Palpitations    SOB (shortness of breath)         Past Medical History:   has a past medical history of Acid reflux, ADD (attention deficit disorder), Arthritis, Back pain, Nausea, PONV (postoperative nausea and vomiting), Spinal cord stimulator status, and Wears dentures. Surgical History:   has a past surgical history that includes Hysterectomy; Knee arthroscopy (Right); Hand surgery (Bilateral); Cholecystectomy; back surgery (); and knee surgery (Right, 2017). Social History:   reports that she has been smoking. She has been smoking about 0.50 packs per day.  She has never used smokeless tobacco. She reports that she does not drink alcohol or use drugs. Family History: Mother - Atrial Fibrillation  Maternal grandfather - MI (?)       Home Medications:  Reviewed and are listed in nursing record. and/or listed below  Current Outpatient Medications   Medication Sig Dispense Refill    estrogens, conjugated,-methyltestosterone (ESTRATEST) 1.25-2.5 MG per tablet Take 1 tablet by mouth.  gabapentin (NEURONTIN) 300 MG capsule Take 1,200 mg by mouth.  aspirin 325 MG EC tablet Take 325 mg by mouth      pantoprazole (PROTONIX) 40 MG tablet Take 40 mg by mouth      cyclobenzaprine (FLEXERIL) 10 MG tablet Take 10 mg by mouth      amphetamine-dextroamphetamine (ADDERALL, 10MG,) 10 MG tablet Take 30 mg by mouth daily.  promethazine (PHENERGAN) 25 MG tablet TAKE 1 TABLET BY MOUTH 3 TIMES A DAY AS NEEDED  3     No current facility-administered medications for this visit. Allergies:  Patient has no known allergies. Review of Systems:   A 14 point review of symptoms completed. Pertinent positives identified in the HPI, all other review of symptoms negative as below.     Objective:   PHYSICAL EXAM:    Vitals:    04/29/21 1033   BP: 130/70   Pulse: 79   SpO2: 98%    Weight: 141 lb (64 kg)     Wt Readings from Last 3 Encounters:   04/29/21 141 lb (64 kg)   03/08/19 143 lb (64.9 kg)   12/10/18 142 lb (64.4 kg)         General Appearance:  Alert, cooperative, no distress, appears stated age   Head:  Normocephalic, atraumatic   Eyes:  PERRL, conjunctiva/corneas clear   Nose: Nares normal, no drainage or sinus tenderness   Throat: Lips, mucosa, and tongue normal   Neck: Supple, symmetrical, trachea midline, NL thyroid no carotid bruit or JVD   Lungs:   CTAB, respirations unlabored   Chest Wall:  No tenderness or deformity   Heart:  Regular rhythm and normal rate; S1, S2 are normal;   no murmur noted; no rub or gallop   Abdomen:   Soft, non-tender, +BS x 4, no masses, no organomegaly   Extremities: Extremities normal, atraumatic, no cyanosis or edema. Does have increased swelling around the metacarpal joints in both hands. Pulses: 2+ and symmetric   Skin: Skin color, texture, turgor normal, no rashes or lesions   Pysch: Normal mood and affect   Neurologic: Normal gross motor and sensory exam.         LABS   CBC:      Lab Results   Component Value Date    WBC 8.0 2017    RBC 4.27 2017    HGB 13.2 2017    HCT 39.9 2017    MCV 93.4 2017    RDW 14.7 2017     2017     CMP:  Lab Results   Component Value Date     2017    K 4.8 2017     2017    CO2 29 2017    BUN 11 2017    CREATININE 0.7 2017    GFRAA >60 2017    AGRATIO 1.0 2017    LABGLOM >60 2017    GLUCOSE 101 2017    PROT 8.2 2017    CALCIUM 9.1 2017    BILITOT 0.4 2017    ALKPHOS 204 2017    AST 32 2017    ALT 25 2017     PT/INR:   No results found for: PTINR  Liver:  No components found for: CHLPL  Lab Results   Component Value Date    ALT 25 2017    AST 32 2017    ALKPHOS 204 (H) 2017    BILITOT 0.4 2017     No results found for: LABA1C  Lipids:         Lab Results   Component Value Date    TRIG 107 2017            Lab Results   Component Value Date    HDL 30 (L) 2017            Lab Results   Component Value Date    LDLCALC 85 2017            Lab Results   Component Value Date    LABVLDL 21 2017         CARDIAC DATA   EK2021 normal sinus rhythm. ECHO/MUGA: 17   Summary   Normal left ventricle systolic function with an estimated ejection fraction   of 55%. No regional wall motion abnormalities are seen. Normal left ventricular diastolic filling pressure. Systolic pulmonary artery pressure (SPAP) is normal and estimated at 28 mmHg   (RA pressure 3 mmHg).    A bubble study was performed and fails to show evidence of shunting. STRESS TEST:    CARDIAC CATH:    VASCULAR/OTHER IMAGING:      Assessment and Plan   Susan Palacios is a 54 y.o. female who presents today for the following problems:      1. Palpitations: Sounds like possible PACs or PVCs. 2.  Dyspnea on exertion/decreased exercise tolerance  3. History of MCA aneurysm  4. History of CVA: May be associated with a dissection of left ICA per patient  5. Tobacco abuse    MD Plan:  1. Patient symptoms are concerning for possible cardiac etiology. 2. GXT stress Myoview. Okay to convert to Donna  3. Full echocardiogram with bubbles given history of stroke  4.  30-day monitor given severe fatigue and palpitations   -Also rule out occult A. fib flutter  5. Check TSH, BNP, ROMANA, ANCA, sed rate, CRP. 6.  If all above negative would consider cardiac CTA to further evaluate given her symptoms  7. Educated on tobacco abuse        Patient Active Problem List   Diagnosis    Arthritis of right knee    Status post total right knee replacement    ICAO (internal carotid artery occlusion), left    Thrombocytosis (HCC)    Expressive aphasia    Transient cerebral ischemia    Smoking    Carotid occlusion, left    TIA (transient ischemic attack)    Palpitations    SOB (shortness of breath)       Patient Plan:  1. Stress test to risk stratify for blockage  2. Echocardiogram to view size and strength of the heart   3. 30 DAY MONITOR  4. LABS -  TSH, BNP, ANCA, ROMANA, SED RATE AND CRP   5. We will call you with the results       It is a pleasure to assist in the care of Susan Palacios. Please call with any questions. This note was scribed in the presence of Kameron Franco MD by Viri George RN.      Deborah Diop MD, personally performed the services described in this documentation as scribed by the above signed scribe in my presence, and it is both accurate and complete to the best of our ability and knowledge.  I agree with the details independently gathered by

## 2021-04-29 ENCOUNTER — OFFICE VISIT (OUTPATIENT)
Dept: CARDIOLOGY CLINIC | Age: 56
End: 2021-04-29
Payer: COMMERCIAL

## 2021-04-29 VITALS
SYSTOLIC BLOOD PRESSURE: 130 MMHG | HEART RATE: 79 BPM | HEIGHT: 65 IN | OXYGEN SATURATION: 98 % | DIASTOLIC BLOOD PRESSURE: 70 MMHG | WEIGHT: 141 LBS | BODY MASS INDEX: 23.49 KG/M2

## 2021-04-29 DIAGNOSIS — Z86.73 HISTORY OF CVA (CEREBROVASCULAR ACCIDENT): ICD-10-CM

## 2021-04-29 DIAGNOSIS — R00.2 PALPITATION: ICD-10-CM

## 2021-04-29 DIAGNOSIS — I65.22 ICAO (INTERNAL CAROTID ARTERY OCCLUSION), LEFT: ICD-10-CM

## 2021-04-29 DIAGNOSIS — R06.00 DYSPNEA, UNSPECIFIED TYPE: ICD-10-CM

## 2021-04-29 DIAGNOSIS — R06.02 SOB (SHORTNESS OF BREATH): ICD-10-CM

## 2021-04-29 DIAGNOSIS — R00.2 PALPITATIONS: Primary | ICD-10-CM

## 2021-04-29 DIAGNOSIS — Z72.0 TOBACCO ABUSE: ICD-10-CM

## 2021-04-29 PROCEDURE — 93270 REMOTE 30 DAY ECG REV/REPORT: CPT | Performed by: INTERNAL MEDICINE

## 2021-04-29 PROCEDURE — 99214 OFFICE O/P EST MOD 30 MIN: CPT | Performed by: INTERNAL MEDICINE

## 2021-04-29 PROCEDURE — 93000 ELECTROCARDIOGRAM COMPLETE: CPT | Performed by: INTERNAL MEDICINE

## 2021-04-29 NOTE — PATIENT INSTRUCTIONS
Patient Plan:  1. Stress test to risk stratify for blockage  2. Echocardiogram to view size and strength of the heart   3. 30 DAY MONITOR  4. LABS -  TSH, BNP, ANCA, ROMANA, SED RATE AND CRP   5. We will call you with the results     Your provider has ordered testing for further evaluation. An order/prescription has been included in your paper work.  To schedule outpatient testing, contact Central Scheduling by calling [employer] ([phone]).

## 2021-04-29 NOTE — LETTER
no organomegaly   Extremities: Extremities normal, atraumatic, no cyanosis or edema. Does have increased swelling around the metacarpal joints in both hands. Pulses: 2+ and symmetric   Skin: Skin color, texture, turgor normal, no rashes or lesions   Pysch: Normal mood and affect   Neurologic: Normal gross motor and sensory exam.         LABS   CBC:      Lab Results   Component Value Date    WBC 8.0 2017    RBC 4.27 2017    HGB 13.2 2017    HCT 39.9 2017    MCV 93.4 2017    RDW 14.7 2017     2017     CMP:  Lab Results   Component Value Date     2017    K 4.8 2017     2017    CO2 29 2017    BUN 11 2017    CREATININE 0.7 2017    GFRAA >60 2017    AGRATIO 1.0 2017    LABGLOM >60 2017    GLUCOSE 101 2017    PROT 8.2 2017    CALCIUM 9.1 2017    BILITOT 0.4 2017    ALKPHOS 204 2017    AST 32 2017    ALT 25 2017     PT/INR:   No results found for: PTINR  Liver:  No components found for: CHLPL  Lab Results   Component Value Date    ALT 25 2017    AST 32 2017    ALKPHOS 204 (H) 2017    BILITOT 0.4 2017     No results found for: LABA1C  Lipids:         Lab Results   Component Value Date    TRIG 107 2017            Lab Results   Component Value Date    HDL 30 (L) 2017            Lab Results   Component Value Date    LDLCALC 85 2017            Lab Results   Component Value Date    LABVLDL 21 2017         CARDIAC DATA   EK2021 normal sinus rhythm. ECHO/MUGA: 17   Summary   Normal left ventricle systolic function with an estimated ejection fraction   of 55%. No regional wall motion abnormalities are seen. Normal left ventricular diastolic filling pressure. Systolic pulmonary artery pressure (SPAP) is normal and estimated at 28 mmHg   (RA pressure 3 mmHg).    A bubble study was performed and fails to show evidence of shunting. STRESS TEST:    CARDIAC CATH:    VASCULAR/OTHER IMAGING:      Assessment and Plan   Xuan Coats is a 54 y.o. female who presents today for the following problems:      1. Palpitations: Sounds like possible PACs or PVCs. 2.  Dyspnea on exertion/decreased exercise tolerance  3. History of MCA aneurysm  4. History of CVA: May be associated with a dissection of left ICA per patient  5. Tobacco abuse    MD Plan:  1. Patient symptoms are concerning for possible cardiac etiology. 2. GXT stress Myoview. Okay to convert to Donna  3. Full echocardiogram with bubbles given history of stroke  4.  30-day monitor given severe fatigue and palpitations   -Also rule out occult A. fib flutter  5. Check TSH, BNP, ROMANA, ANCA, sed rate, CRP. 6.  If all above negative would consider cardiac CTA to further evaluate given her symptoms  7. Educated on tobacco abuse        Patient Active Problem List   Diagnosis    Arthritis of right knee    Status post total right knee replacement    ICAO (internal carotid artery occlusion), left    Thrombocytosis (HCC)    Expressive aphasia    Transient cerebral ischemia    Smoking    Carotid occlusion, left    TIA (transient ischemic attack)    Palpitations    SOB (shortness of breath)       Patient Plan:  1. Stress test to risk stratify for blockage  2. Echocardiogram to view size and strength of the heart   3. 30 DAY MONITOR  4. LABS -  TSH, BNP, ANCA, ROMANA, SED RATE AND CRP   5. We will call you with the results       It is a pleasure to assist in the care of Xuan Coats. Please call with any questions. This note was scribed in the presence of Sarah Stephens MD by Kylie Branham RN.      Ed Cunningham MD, personally performed the services described in this documentation as scribed by the above signed scribe in my presence, and it is both accurate and complete to the best of our ability and knowledge.  I agree with the details independently gathered by my clinical support staff, while the remaining scribed note accurately describes my personal service to the patient. The above RN is working as a scribe for and in the presence of myself . Working as a scribe, the RN may have prepopulated components of this note with my historical intellectual property under my direct supervision. Any additions to this intellectual property were performed at my direction. Furthermore, the content and accuracy of this note have been reviewed by me to the best of my ability.              Ashia Cruz MD, [address] [facility] Cardiologist  [facility]  [phone] [facility]  [phone] [address] [place] [address]

## 2021-04-30 ENCOUNTER — TELEPHONE (OUTPATIENT)
Dept: CARDIOLOGY CLINIC | Age: 56
End: 2021-04-30

## 2021-04-30 NOTE — TELEPHONE ENCOUNTER
Monitor placed by MM  Monitor company [employer]  Length of monitor 30 Days  Monitor ordered by Silva Vieyra  Serial number [id number]  Kit ID   Activation successful prior to pt leaving office?  YES

## 2021-05-05 ENCOUNTER — HOSPITAL ENCOUNTER (OUTPATIENT)
Age: 56
Discharge: HOME | End: 2021-05-05
Payer: COMMERCIAL

## 2021-05-05 DIAGNOSIS — I65.22 ICAO (INTERNAL CAROTID ARTERY OCCLUSION), LEFT: ICD-10-CM

## 2021-05-05 DIAGNOSIS — R06.02 SOB (SHORTNESS OF BREATH): ICD-10-CM

## 2021-05-05 DIAGNOSIS — R00.2 PALPITATIONS: ICD-10-CM

## 2021-05-05 LAB
C-REACTIVE PROTEIN: <3 MG/L (ref 0–5.1)
PRO-BNP: 120 PG/ML (ref 0–124)
SEDIMENTATION RATE, ERYTHROCYTE: 8 MM/HR (ref 0–30)
T4 FREE: 0.6 NG/DL (ref 0.9–1.8)
TSH REFLEX FT4: 4.35 UIU/ML (ref 0.27–4.2)

## 2021-05-05 PROCEDURE — 36415 COLL VENOUS BLD VENIPUNCTURE: CPT

## 2021-05-05 PROCEDURE — 86140 C-REACTIVE PROTEIN: CPT

## 2021-05-05 PROCEDURE — 83880 ASSAY OF NATRIURETIC PEPTIDE: CPT

## 2021-05-05 PROCEDURE — 85652 RBC SED RATE AUTOMATED: CPT

## 2021-05-05 PROCEDURE — 84443 ASSAY THYROID STIM HORMONE: CPT

## 2021-05-05 PROCEDURE — 86038 ANTINUCLEAR ANTIBODIES: CPT

## 2021-05-05 PROCEDURE — 84439 ASSAY OF FREE THYROXINE: CPT

## 2021-05-05 PROCEDURE — 86255 FLUORESCENT ANTIBODY SCREEN: CPT

## 2021-05-06 LAB — ANTI-NUCLEAR ANTIBODY (ANA): NEGATIVE

## 2021-05-07 LAB — ANCA IFA: NORMAL

## 2021-05-10 ENCOUNTER — TELEPHONE (OUTPATIENT)
Dept: CARDIOLOGY CLINIC | Age: 56
End: 2021-05-10

## 2021-05-10 NOTE — TELEPHONE ENCOUNTER
----- Message from Nicky Hamm MD sent at 5/8/2021  7:40 AM EDT -----  Let patient know their anca test is normal, continue current meds, no new orders or changes at this time. Thanks.

## 2021-05-25 ENCOUNTER — HOSPITAL ENCOUNTER (OUTPATIENT)
Dept: NUCLEAR MEDICINE | Age: 56
Discharge: HOME | End: 2021-05-25
Payer: COMMERCIAL

## 2021-05-25 ENCOUNTER — HOSPITAL ENCOUNTER (OUTPATIENT)
Dept: NON INVASIVE DIAGNOSTICS | Age: 56
Discharge: HOME | End: 2021-05-25
Payer: COMMERCIAL

## 2021-05-25 DIAGNOSIS — R00.2 PALPITATIONS: ICD-10-CM

## 2021-05-25 DIAGNOSIS — R06.02 SOB (SHORTNESS OF BREATH): ICD-10-CM

## 2021-05-25 LAB
LV EF: 58 %
LV EF: 60 %
LVEF MODALITY: NORMAL
LVEF MODALITY: NORMAL

## 2021-05-25 PROCEDURE — 93017 CV STRESS TEST TRACING ONLY: CPT

## 2021-05-25 PROCEDURE — 78452 HT MUSCLE IMAGE SPECT MULT: CPT

## 2021-05-25 PROCEDURE — 93306 TTE W/DOPPLER COMPLETE: CPT

## 2021-05-25 PROCEDURE — 3430000000 HC RX DIAGNOSTIC RADIOPHARMACEUTICAL: Performed by: INTERNAL MEDICINE

## 2021-05-25 PROCEDURE — A9502 TC99M TETROFOSMIN: HCPCS | Performed by: INTERNAL MEDICINE

## 2021-05-25 PROCEDURE — 6360000002 HC RX W HCPCS: Performed by: INTERNAL MEDICINE

## 2021-05-25 RX ADMIN — REGADENOSON 0.4 MG: 0.08 INJECTION, SOLUTION INTRAVENOUS at 08:44

## 2021-05-25 RX ADMIN — TETROFOSMIN 11 MILLICURIE: 1.38 INJECTION, POWDER, LYOPHILIZED, FOR SOLUTION INTRAVENOUS at 07:25

## 2021-05-25 RX ADMIN — TETROFOSMIN 31.7 MILLICURIE: 1.38 INJECTION, POWDER, LYOPHILIZED, FOR SOLUTION INTRAVENOUS at 08:44

## 2021-05-25 NOTE — PROGRESS NOTES
Patient educated on cardiac stress testing. Pt. Kathi Zarate understanding of cardiac stress testing. Questions and concerns addressed. Pt. Is agreeable to proceed with stress testing.

## 2021-05-25 NOTE — PROGRESS NOTES
Pt completed stress portion of cardiac stress test. Patient unable to reach target HR due to extreme SOB. Lexiscan stress test completed. Pt is discharged to nuclear department for final scan. Nuclear tech will remove PIV. Discharge instructions given to pt. Pt verbalizes understanding to discharge instructions.

## 2021-05-26 ENCOUNTER — TELEPHONE (OUTPATIENT)
Dept: CARDIOLOGY CLINIC | Age: 56
End: 2021-05-26

## 2021-05-26 DIAGNOSIS — R06.02 SOB (SHORTNESS OF BREATH): Primary | ICD-10-CM

## 2021-05-26 DIAGNOSIS — R00.2 PALPITATIONS: ICD-10-CM

## 2021-05-26 NOTE — TELEPHONE ENCOUNTER
----- Message from Cindy Lima MD sent at 5/25/2021  4:18 PM EDT -----  The patient know echo and stress test was negative.   Given her symptoms I like to take a step further and get a cardiac CT angio to evaluate and then she should follow up with me in the office

## 2021-05-26 NOTE — TELEPHONE ENCOUNTER
Spoke with pt, relayed message per [employer]. Pt v/u and will call scheduling to set up CT. Pt made [employer] appt for 7/29/2021.

## 2021-06-10 ENCOUNTER — HOSPITAL ENCOUNTER (OUTPATIENT)
Dept: VASCULAR LAB | Age: 56
Discharge: HOME | End: 2021-06-10
Payer: COMMERCIAL

## 2021-06-10 ENCOUNTER — HOSPITAL ENCOUNTER (OUTPATIENT)
Dept: CT IMAGING | Age: 56
Discharge: HOME | End: 2021-06-10
Payer: COMMERCIAL

## 2021-06-10 DIAGNOSIS — R06.02 SOB (SHORTNESS OF BREATH): ICD-10-CM

## 2021-06-10 DIAGNOSIS — R00.2 PALPITATIONS: ICD-10-CM

## 2021-06-10 DIAGNOSIS — I73.9 PERIPHERAL VASCULAR DISEASE, UNSPECIFIED (HCC): ICD-10-CM

## 2021-06-10 PROCEDURE — 6360000004 HC RX CONTRAST MEDICATION: Performed by: INTERNAL MEDICINE

## 2021-06-10 PROCEDURE — 75574 CT ANGIO HRT W/3D IMAGE: CPT

## 2021-06-10 RX ADMIN — IOPAMIDOL 110 ML: 755 INJECTION, SOLUTION INTRAVENOUS at 10:45

## 2021-06-16 ENCOUNTER — TELEPHONE (OUTPATIENT)
Dept: CARDIOLOGY CLINIC | Age: 56
End: 2021-06-16

## 2021-06-16 RX ORDER — ATORVASTATIN CALCIUM 40 MG/1
40 TABLET, FILM COATED ORAL DAILY
Qty: 90 TABLET | Refills: 3 | Status: SHIPPED | OUTPATIENT
Start: 2021-06-16 | End: 2022-06-02

## 2021-06-16 NOTE — TELEPHONE ENCOUNTER
----- Message from Zulema Kerr MD sent at 6/11/2021  7:31 PM EDT -----  Let pt know Cardiac CTA did show early CAD but nothing critical or would cause symptoms. Would suggest continue ASA but add lipitor 20mg poqHS to reduce CV risk. Call if muscle aches.

## 2021-06-22 ENCOUNTER — TELEPHONE (OUTPATIENT)
Dept: CARDIOLOGY CLINIC | Age: 56
End: 2021-06-22

## 2021-06-22 PROCEDURE — 93272 ECG/REVIEW INTERPRET ONLY: CPT | Performed by: INTERNAL MEDICINE

## 2021-06-22 NOTE — TELEPHONE ENCOUNTER
Spoke with patient:  Monitor results relayed -  Rare PVC's and PAC's and rare PSVT - asymptomatic. NSR with symptoms.

## 2021-06-23 ENCOUNTER — TELEPHONE (OUTPATIENT)
Dept: CARDIOLOGY CLINIC | Age: 56
End: 2021-06-23

## 2021-06-23 NOTE — TELEPHONE ENCOUNTER
Monitor results read by MD. Results have been scanned into pt chart, located under Media tab and titled as Cardiac Event Monitor. Double click on this to open file for viewing.

## 2021-07-28 NOTE — PROGRESS NOTES
[facility]   Cardiovascular Evaluation    PATIENT: Isreal Coleman  DATE: 2021  MRN: [id number]  CSN: [id number]  : 1965      Primary Care Doctor: Alejandra Coleman MD  Reason for evaluation:   Follow-up, Advice Only (blood spot behind left eye), and Palpitations      Subjective:   History of present illness on initial date of evaluation:   Isreal Coleman is a 54 y.o. patient who presents referred by her PCP, Dr. Evelyn Herring, for concerns of palpitations and dyspnea. OV 21 she reported she was getting dyspnea with exertion. States it does not take much exertion to become short of breath. She stated this is new. She denied any chest pain, dizziness or syncope. She states she did have swelling in her BLE. She states she did feel palpitations. Not daily, but a few times a week. Stated it feels like her heart is skipping beats. Does not last.  She states she was currently using a nicotine patch, but does take it off at times to smoke. Since last visit she had an Echo which showed an EF of 55-60%. She proceeded with a stress test on 21 which was negative for ischemia or scarring. She wore a 30 day event monitor that showed rare PVC or PAC as well as an asymptomatic episode of PSVT. She was noted to have palpitations present in NSR. Today she reports just feeling overall tired. Patient states she occasional will get palpitations. She is noted to take adderall. Patient denies current edema, chest pain, sob, dizziness or syncope. Patient is taking all medications as prescribed, tolerating well. She states she had an eye study done which showed blood spot behind her eye.        Patient Active Problem List   Diagnosis    Arthritis of right knee    Status post total right knee replacement    ICAO (internal carotid artery occlusion), left    Thrombocytosis (HCC)    Expressive aphasia    Transient cerebral ischemia    Smoking    Carotid occlusion, left    TIA (transient ischemic attack)    Palpitations    Dyspnea on exertion         Past Medical History:   has a past medical history of Acid reflux, ADD (attention deficit disorder), Arthritis, Back pain, Nausea, PONV (postoperative nausea and vomiting), Spinal cord stimulator status, and Wears dentures. Surgical History:   has a past surgical history that includes Hysterectomy; Knee arthroscopy (Right); Hand surgery (Bilateral); Cholecystectomy; back surgery (1997); and knee surgery (Right, 11/21/2017). Social History:   reports that she has been smoking. She has been smoking about 0.50 packs per day. She has never used smokeless tobacco. She reports that she does not drink alcohol and does not use drugs. Family History: Mother - Atrial Fibrillation  Maternal grandfather - MI (?)       Home Medications:  Reviewed and are listed in nursing record. and/or listed below  Current Outpatient Medications   Medication Sig Dispense Refill    atorvastatin (LIPITOR) 40 MG tablet Take 1 tablet by mouth daily 90 tablet 3    estrogens, conjugated,-methyltestosterone (ESTRATEST) 1.25-2.5 MG per tablet Take 1 tablet by mouth.  gabapentin (NEURONTIN) 300 MG capsule Take 1,200 mg by mouth.  aspirin 325 MG EC tablet Take 325 mg by mouth      pantoprazole (PROTONIX) 40 MG tablet Take 40 mg by mouth      cyclobenzaprine (FLEXERIL) 10 MG tablet Take 10 mg by mouth      amphetamine-dextroamphetamine (ADDERALL, 10MG,) 10 MG tablet Take 30 mg by mouth daily.  promethazine (PHENERGAN) 25 MG tablet TAKE 1 TABLET BY MOUTH 3 TIMES A DAY AS NEEDED  3     No current facility-administered medications for this visit. Allergies:  Patient has no known allergies. Review of Systems:   A 14 point review of symptoms completed. Pertinent positives identified in the HPI, all other review of symptoms negative as below.     Objective:   PHYSICAL EXAM:    Vitals:    07/29/21 1452   BP: 120/70   Pulse: 82   SpO2: 96% Weight: 134 lb (60.8 kg)     Wt Readings from Last 3 Encounters:   07/29/21 134 lb (60.8 kg)   04/29/21 141 lb (64 kg)   03/08/19 143 lb (64.9 kg)         General Appearance:  Alert, cooperative, no distress, appears stated age   Head:  Normocephalic, atraumatic   Eyes:  PERRL, conjunctiva/corneas clear   Nose: Nares normal, no drainage or sinus tenderness   Throat: Lips, mucosa, and tongue normal   Neck: Supple, symmetrical, trachea midline, NL thyroid no carotid bruit or JVD   Lungs:   CTAB, respirations unlabored   Chest Wall:  No tenderness or deformity   Heart:  Regular rhythm and normal rate; S1, S2 are normal;   no murmur noted; no rub or gallop   Abdomen:   Soft, non-tender, +BS x 4, no masses, no organomegaly   Extremities: Extremities normal, atraumatic, no cyanosis or edema. Does have increased swelling around the metacarpal joints in both hands.    Pulses: 2+ and symmetric   Skin: Skin color, texture, turgor normal, no rashes or lesions   Pysch: Normal mood and affect   Neurologic: Normal gross motor and sensory exam.         LABS   CBC:      Lab Results   Component Value Date    WBC 8.0 12/14/2017    RBC 4.27 12/14/2017    HGB 13.2 12/14/2017    HCT 39.9 12/14/2017    MCV 93.4 12/14/2017    RDW 14.7 12/14/2017     12/14/2017     CMP:  Lab Results   Component Value Date     12/14/2017    K 4.8 12/14/2017     12/14/2017    CO2 29 12/14/2017    BUN 11 12/14/2017    CREATININE 0.7 12/14/2017    GFRAA >60 12/14/2017    AGRATIO 1.0 12/13/2017    LABGLOM >60 12/14/2017    GLUCOSE 101 12/14/2017    PROT 8.2 12/13/2017    CALCIUM 9.1 12/14/2017    BILITOT 0.4 12/13/2017    ALKPHOS 204 12/13/2017    AST 32 12/13/2017    ALT 25 12/13/2017     PT/INR:   No results found for: PTINR  Liver:  No components found for: CHLPL  Lab Results   Component Value Date    ALT 25 12/13/2017    AST 32 12/13/2017    ALKPHOS 204 (H) 12/13/2017    BILITOT 0.4 12/13/2017     No results found for: LABA1C  Lipids: not show A. Fib   -Given her stroke was thought from ICA dissection no need for loop monitor    No further work-up at this time unless new symptoms arise. Will release patient to PCP follow-up as needed      Patient Active Problem List   Diagnosis    Arthritis of right knee    Status post total right knee replacement    ICAO (internal carotid artery occlusion), left    Thrombocytosis (HCC)    Expressive aphasia    Transient cerebral ischemia    Smoking    Carotid occlusion, left    TIA (transient ischemic attack)    Palpitations    Dyspnea on exertion       Patient Plan:  1. Abe Calero MD PCP to re-check tyroid function studies  ~patient symptoms experiencing loss of hair, palpitations, and fatigue. 2. Follow up as needed. It is a pleasure to assist in the care of Cesar Sor. Please call with any questions. Scribe's attestation: This note was scribed in the presence of Steve Branham by Nava Domingo MD, personally performed the services described in this documentation as scribed by the above signed scribe in my presence, and it is both accurate and complete to the best of our ability and knowledge. I agree with the details independently gathered by my clinical support staff, while the remaining scribed note accurately describes my personal service to the patient. The above RN is working as a scribe for and in the presence of myself . Working as a scribe, the RN may have prepopulated components of this note with my historical intellectual property under my direct supervision. Any additions to this intellectual property were performed at my direction. Furthermore, the content and accuracy of this note have been reviewed by me to the best of my ability.                Nava Whitfield MD, [address] Cardiologist  [facility] [place]  [phone] [facility]  [phone] [address] [place] [address]

## 2021-07-29 ENCOUNTER — OFFICE VISIT (OUTPATIENT)
Dept: CARDIOLOGY CLINIC | Age: 56
End: 2021-07-29
Payer: COMMERCIAL

## 2021-07-29 VITALS
SYSTOLIC BLOOD PRESSURE: 120 MMHG | HEART RATE: 82 BPM | WEIGHT: 134 LBS | OXYGEN SATURATION: 96 % | HEIGHT: 65 IN | DIASTOLIC BLOOD PRESSURE: 70 MMHG | BODY MASS INDEX: 22.33 KG/M2

## 2021-07-29 DIAGNOSIS — I49.1 PAC (PREMATURE ATRIAL CONTRACTION): ICD-10-CM

## 2021-07-29 DIAGNOSIS — R06.09 DOE (DYSPNEA ON EXERTION): ICD-10-CM

## 2021-07-29 DIAGNOSIS — R00.2 PALPITATIONS: Primary | ICD-10-CM

## 2021-07-29 DIAGNOSIS — Z86.73 HISTORY OF STROKE: ICD-10-CM

## 2021-07-29 PROCEDURE — 99214 OFFICE O/P EST MOD 30 MIN: CPT | Performed by: INTERNAL MEDICINE

## 2021-07-29 NOTE — PATIENT INSTRUCTIONS
Patient Plan:  1. Manju Hui MD PCP to re-check tyroid function studies  ~patient symptoms experiencing loss of hair, palpitations, and fatigue. 2. Follow up as needed.

## 2021-11-18 ENCOUNTER — HOSPITAL ENCOUNTER (OUTPATIENT)
Dept: CT IMAGING | Age: 56
Discharge: HOME | End: 2021-11-18

## 2021-11-18 DIAGNOSIS — I67.1 CEREBRAL ANEURYSM, NONRUPTURED: ICD-10-CM

## 2021-11-18 DIAGNOSIS — I65.22 STENOSIS OF LEFT CAROTID ARTERY: ICD-10-CM

## 2021-11-18 DIAGNOSIS — I65.22 OCCLUSION OF LEFT CAROTID ARTERY: ICD-10-CM

## 2021-11-18 PROCEDURE — 6360000004 HC RX CONTRAST MEDICATION: Performed by: NEUROLOGICAL SURGERY

## 2021-11-18 PROCEDURE — 70450 CT HEAD/BRAIN W/O DYE: CPT

## 2021-11-18 PROCEDURE — 70498 CT ANGIOGRAPHY NECK: CPT

## 2021-11-18 RX ADMIN — IOPAMIDOL 75 ML: 755 INJECTION, SOLUTION INTRAVENOUS at 11:01

## 2022-06-02 RX ORDER — ATORVASTATIN CALCIUM 40 MG/1
TABLET, FILM COATED ORAL
Qty: 60 TABLET | Refills: 0 | Status: SHIPPED | OUTPATIENT
Start: 2022-06-02 | End: 2022-07-25

## 2022-07-25 RX ORDER — ATORVASTATIN CALCIUM 40 MG/1
TABLET, FILM COATED ORAL
Qty: 60 TABLET | Refills: 0 | Status: SHIPPED | OUTPATIENT
Start: 2022-07-25 | End: 2022-09-22

## 2022-07-25 NOTE — TELEPHONE ENCOUNTER
Pt/pharmacy requesting atorvastatin 40 mg tab. Pending script sent to [address] [place] [address] Ave 7/29/21 ARONP  Per last OV note: pt is to follow up as needed  Last lipid 12/3/2017  No upcoming OV scheduled at this time.

## 2022-09-22 RX ORDER — ATORVASTATIN CALCIUM 40 MG/1
TABLET, FILM COATED ORAL
Qty: 60 TABLET | Refills: 0 | Status: SHIPPED | OUTPATIENT
Start: 2022-09-22

## 2022-10-12 ENCOUNTER — APPOINTMENT (OUTPATIENT)
Dept: CT IMAGING | Age: 57
DRG: 372 | End: 2022-10-12
Payer: MEDICARE

## 2022-10-12 ENCOUNTER — HOSPITAL ENCOUNTER (INPATIENT)
Age: 57
LOS: 2 days | Discharge: HOME | DRG: 372 | End: 2022-10-14
Attending: EMERGENCY MEDICINE | Admitting: INTERNAL MEDICINE
Payer: MEDICARE

## 2022-10-12 DIAGNOSIS — K52.9 COLITIS: Primary | ICD-10-CM

## 2022-10-12 DIAGNOSIS — R10.84 GENERALIZED ABDOMINAL PAIN: ICD-10-CM

## 2022-10-12 DIAGNOSIS — K62.5 BRBPR (BRIGHT RED BLOOD PER RECTUM): ICD-10-CM

## 2022-10-12 DIAGNOSIS — D72.829 LEUKOCYTOSIS, UNSPECIFIED TYPE: ICD-10-CM

## 2022-10-12 LAB
A/G RATIO: 1.7 (ref 1.1–2.2)
ABO/RH: NORMAL
ALBUMIN SERPL-MCNC: 4.2 G/DL (ref 3.4–5)
ALP BLD-CCNC: 181 U/L (ref 40–129)
ALT SERPL-CCNC: 35 U/L (ref 10–40)
ANION GAP SERPL CALCULATED.3IONS-SCNC: 11 MMOL/L (ref 3–16)
ANTIBODY SCREEN: NORMAL
AST SERPL-CCNC: 30 U/L (ref 15–37)
BACTERIA: ABNORMAL /HPF
BASOPHILS ABSOLUTE: 0.2 K/UL (ref 0–0.2)
BASOPHILS RELATIVE PERCENT: 0.8 %
BILIRUB SERPL-MCNC: 0.9 MG/DL (ref 0–1)
BILIRUBIN URINE: ABNORMAL
BLOOD, URINE: ABNORMAL
BUN BLDV-MCNC: 18 MG/DL (ref 7–20)
CALCIUM SERPL-MCNC: 9.5 MG/DL (ref 8.3–10.6)
CHLORIDE BLD-SCNC: 101 MMOL/L (ref 99–110)
CLARITY: CLEAR
CO2: 27 MMOL/L (ref 21–32)
COLOR: YELLOW
CREAT SERPL-MCNC: 1 MG/DL (ref 0.6–1.1)
EOSINOPHILS ABSOLUTE: 0 K/UL (ref 0–0.6)
EOSINOPHILS RELATIVE PERCENT: 0 %
EPITHELIAL CELLS, UA: ABNORMAL /HPF (ref 0–5)
GFR AFRICAN AMERICAN: >60
GFR NON-AFRICAN AMERICAN: 57
GLUCOSE BLD-MCNC: 120 MG/DL (ref 70–99)
GLUCOSE URINE: NEGATIVE MG/DL
HCT VFR BLD CALC: 43.2 % (ref 36–48)
HCT VFR BLD CALC: 51.9 % (ref 36–48)
HEMOGLOBIN: 14.1 G/DL (ref 12–16)
HEMOGLOBIN: 17.1 G/DL (ref 12–16)
KETONES, URINE: ABNORMAL MG/DL
LACTIC ACID, SEPSIS: 0.8 MMOL/L (ref 0.4–1.9)
LEUKOCYTE ESTERASE, URINE: NEGATIVE
LYMPHOCYTES ABSOLUTE: 1.5 K/UL (ref 1–5.1)
LYMPHOCYTES RELATIVE PERCENT: 4.9 %
MCH RBC QN AUTO: 31 PG (ref 26–34)
MCHC RBC AUTO-ENTMCNC: 32.9 G/DL (ref 31–36)
MCV RBC AUTO: 94.2 FL (ref 80–100)
MICROSCOPIC EXAMINATION: YES
MONOCYTES ABSOLUTE: 1.1 K/UL (ref 0–1.3)
MONOCYTES RELATIVE PERCENT: 3.6 %
NEUTROPHILS ABSOLUTE: 28.3 K/UL (ref 1.7–7.7)
NEUTROPHILS RELATIVE PERCENT: 90.7 %
NITRITE, URINE: NEGATIVE
PDW BLD-RTO: 14.3 % (ref 12.4–15.4)
PH UA: 6 (ref 5–8)
PLATELET # BLD: 293 K/UL (ref 135–450)
PMV BLD AUTO: 9.2 FL (ref 5–10.5)
POTASSIUM REFLEX MAGNESIUM: 4.4 MMOL/L (ref 3.5–5.1)
PROTEIN UA: NEGATIVE MG/DL
RBC # BLD: 5.52 M/UL (ref 4–5.2)
RBC UA: ABNORMAL /HPF (ref 0–4)
SARS-COV-2, NAAT: NOT DETECTED
SODIUM BLD-SCNC: 139 MMOL/L (ref 136–145)
SPECIFIC GRAVITY UA: 1.02 (ref 1–1.03)
TOTAL PROTEIN: 6.7 G/DL (ref 6.4–8.2)
URINE REFLEX TO CULTURE: ABNORMAL
URINE TYPE: ABNORMAL
UROBILINOGEN, URINE: 1 E.U./DL
WBC # BLD: 31.2 K/UL (ref 4–11)
WBC UA: ABNORMAL /HPF (ref 0–5)

## 2022-10-12 PROCEDURE — 6360000002 HC RX W HCPCS: Performed by: EMERGENCY MEDICINE

## 2022-10-12 PROCEDURE — 86850 RBC ANTIBODY SCREEN: CPT

## 2022-10-12 PROCEDURE — 99285 EMERGENCY DEPT VISIT HI MDM: CPT

## 2022-10-12 PROCEDURE — 2060000000 HC ICU INTERMEDIATE R&B

## 2022-10-12 PROCEDURE — 87040 BLOOD CULTURE FOR BACTERIA: CPT

## 2022-10-12 PROCEDURE — 80053 COMPREHEN METABOLIC PANEL: CPT

## 2022-10-12 PROCEDURE — 96374 THER/PROPH/DIAG INJ IV PUSH: CPT

## 2022-10-12 PROCEDURE — 85014 HEMATOCRIT: CPT

## 2022-10-12 PROCEDURE — 36415 COLL VENOUS BLD VENIPUNCTURE: CPT

## 2022-10-12 PROCEDURE — 96375 TX/PRO/DX INJ NEW DRUG ADDON: CPT

## 2022-10-12 PROCEDURE — 85025 COMPLETE CBC W/AUTO DIFF WBC: CPT

## 2022-10-12 PROCEDURE — 6360000002 HC RX W HCPCS

## 2022-10-12 PROCEDURE — 74174 CTA ABD&PLVS W/CONTRAST: CPT

## 2022-10-12 PROCEDURE — 86900 BLOOD TYPING SEROLOGIC ABO: CPT

## 2022-10-12 PROCEDURE — 96361 HYDRATE IV INFUSION ADD-ON: CPT

## 2022-10-12 PROCEDURE — 99223 1ST HOSP IP/OBS HIGH 75: CPT | Performed by: NURSE PRACTITIONER

## 2022-10-12 PROCEDURE — 2580000003 HC RX 258: Performed by: EMERGENCY MEDICINE

## 2022-10-12 PROCEDURE — 87635 SARS-COV-2 COVID-19 AMP PRB: CPT

## 2022-10-12 PROCEDURE — 6360000004 HC RX CONTRAST MEDICATION: Performed by: EMERGENCY MEDICINE

## 2022-10-12 PROCEDURE — 86901 BLOOD TYPING SEROLOGIC RH(D): CPT

## 2022-10-12 PROCEDURE — C9113 INJ PANTOPRAZOLE SODIUM, VIA: HCPCS

## 2022-10-12 PROCEDURE — 2580000003 HC RX 258

## 2022-10-12 PROCEDURE — 6370000000 HC RX 637 (ALT 250 FOR IP): Performed by: NURSE PRACTITIONER

## 2022-10-12 PROCEDURE — 85018 HEMOGLOBIN: CPT

## 2022-10-12 PROCEDURE — 83605 ASSAY OF LACTIC ACID: CPT

## 2022-10-12 PROCEDURE — 81001 URINALYSIS AUTO W/SCOPE: CPT

## 2022-10-12 RX ORDER — ONDANSETRON 4 MG/1
4 TABLET, ORALLY DISINTEGRATING ORAL EVERY 8 HOURS PRN
Status: DISCONTINUED | OUTPATIENT
Start: 2022-10-12 | End: 2022-10-14 | Stop reason: HOSPADM

## 2022-10-12 RX ORDER — GABAPENTIN 400 MG/1
1200 CAPSULE ORAL NIGHTLY
Status: DISCONTINUED | OUTPATIENT
Start: 2022-10-12 | End: 2022-10-14 | Stop reason: HOSPADM

## 2022-10-12 RX ORDER — SODIUM CHLORIDE 9 MG/ML
INJECTION, SOLUTION INTRAVENOUS PRN
Status: DISCONTINUED | OUTPATIENT
Start: 2022-10-12 | End: 2022-10-14 | Stop reason: HOSPADM

## 2022-10-12 RX ORDER — PANTOPRAZOLE SODIUM 40 MG/10ML
40 INJECTION, POWDER, LYOPHILIZED, FOR SOLUTION INTRAVENOUS 2 TIMES DAILY
Status: DISCONTINUED | OUTPATIENT
Start: 2022-10-12 | End: 2022-10-13

## 2022-10-12 RX ORDER — ONDANSETRON 2 MG/ML
4 INJECTION INTRAMUSCULAR; INTRAVENOUS EVERY 6 HOURS PRN
Status: DISCONTINUED | OUTPATIENT
Start: 2022-10-12 | End: 2022-10-14 | Stop reason: HOSPADM

## 2022-10-12 RX ORDER — ONDANSETRON 2 MG/ML
4 INJECTION INTRAMUSCULAR; INTRAVENOUS EVERY 6 HOURS PRN
Status: DISCONTINUED | OUTPATIENT
Start: 2022-10-12 | End: 2022-10-12

## 2022-10-12 RX ORDER — SODIUM CHLORIDE 0.9 % (FLUSH) 0.9 %
5-40 SYRINGE (ML) INJECTION PRN
Status: DISCONTINUED | OUTPATIENT
Start: 2022-10-12 | End: 2022-10-14 | Stop reason: HOSPADM

## 2022-10-12 RX ORDER — ACETAMINOPHEN 325 MG/1
650 TABLET ORAL EVERY 6 HOURS PRN
Status: DISCONTINUED | OUTPATIENT
Start: 2022-10-12 | End: 2022-10-14 | Stop reason: HOSPADM

## 2022-10-12 RX ORDER — MORPHINE SULFATE 4 MG/ML
4 INJECTION, SOLUTION INTRAMUSCULAR; INTRAVENOUS ONCE
Status: COMPLETED | OUTPATIENT
Start: 2022-10-12 | End: 2022-10-12

## 2022-10-12 RX ORDER — SODIUM CHLORIDE 9 MG/ML
INJECTION, SOLUTION INTRAVENOUS CONTINUOUS
Status: DISCONTINUED | OUTPATIENT
Start: 2022-10-12 | End: 2022-10-14

## 2022-10-12 RX ORDER — MORPHINE SULFATE 2 MG/ML
1 INJECTION, SOLUTION INTRAMUSCULAR; INTRAVENOUS
Status: DISCONTINUED | OUTPATIENT
Start: 2022-10-12 | End: 2022-10-14 | Stop reason: HOSPADM

## 2022-10-12 RX ORDER — 0.9 % SODIUM CHLORIDE 0.9 %
1000 INTRAVENOUS SOLUTION INTRAVENOUS ONCE
Status: COMPLETED | OUTPATIENT
Start: 2022-10-12 | End: 2022-10-12

## 2022-10-12 RX ORDER — ACETAMINOPHEN 650 MG/1
650 SUPPOSITORY RECTAL EVERY 6 HOURS PRN
Status: DISCONTINUED | OUTPATIENT
Start: 2022-10-12 | End: 2022-10-14 | Stop reason: HOSPADM

## 2022-10-12 RX ORDER — MORPHINE SULFATE 2 MG/ML
2 INJECTION, SOLUTION INTRAMUSCULAR; INTRAVENOUS
Status: DISCONTINUED | OUTPATIENT
Start: 2022-10-12 | End: 2022-10-14 | Stop reason: HOSPADM

## 2022-10-12 RX ORDER — SODIUM CHLORIDE 0.9 % (FLUSH) 0.9 %
5-40 SYRINGE (ML) INJECTION EVERY 12 HOURS SCHEDULED
Status: DISCONTINUED | OUTPATIENT
Start: 2022-10-12 | End: 2022-10-14 | Stop reason: HOSPADM

## 2022-10-12 RX ORDER — ATORVASTATIN CALCIUM 40 MG/1
40 TABLET, FILM COATED ORAL DAILY
Status: DISCONTINUED | OUTPATIENT
Start: 2022-10-12 | End: 2022-10-14 | Stop reason: HOSPADM

## 2022-10-12 RX ADMIN — VANCOMYCIN HYDROCHLORIDE 1000 MG: 1 INJECTION, POWDER, LYOPHILIZED, FOR SOLUTION INTRAVENOUS at 15:31

## 2022-10-12 RX ADMIN — SODIUM CHLORIDE: 9 INJECTION, SOLUTION INTRAVENOUS at 16:46

## 2022-10-12 RX ADMIN — Medication 10 ML: at 21:19

## 2022-10-12 RX ADMIN — SODIUM CHLORIDE: 9 INJECTION, SOLUTION INTRAVENOUS at 23:46

## 2022-10-12 RX ADMIN — MORPHINE SULFATE 4 MG: 4 INJECTION, SOLUTION INTRAMUSCULAR; INTRAVENOUS at 11:06

## 2022-10-12 RX ADMIN — MORPHINE SULFATE 2 MG: 2 INJECTION, SOLUTION INTRAMUSCULAR; INTRAVENOUS at 19:06

## 2022-10-12 RX ADMIN — GABAPENTIN 1200 MG: 400 CAPSULE ORAL at 21:40

## 2022-10-12 RX ADMIN — PIPERACILLIN AND TAZOBACTAM 3375 MG: 3; .375 INJECTION, POWDER, FOR SOLUTION INTRAVENOUS at 14:57

## 2022-10-12 RX ADMIN — ONDANSETRON HYDROCHLORIDE 4 MG: 2 INJECTION, SOLUTION INTRAMUSCULAR; INTRAVENOUS at 11:04

## 2022-10-12 RX ADMIN — MORPHINE SULFATE 4 MG: 4 INJECTION, SOLUTION INTRAMUSCULAR; INTRAVENOUS at 14:57

## 2022-10-12 RX ADMIN — SODIUM CHLORIDE 1000 ML: 9 INJECTION, SOLUTION INTRAVENOUS at 11:00

## 2022-10-12 RX ADMIN — PANTOPRAZOLE SODIUM 40 MG: 40 INJECTION, POWDER, FOR SOLUTION INTRAVENOUS at 21:18

## 2022-10-12 RX ADMIN — IOPAMIDOL 100 ML: 755 INJECTION, SOLUTION INTRAVENOUS at 12:21

## 2022-10-12 ASSESSMENT — PAIN DESCRIPTION - LOCATION
LOCATION: ABDOMEN

## 2022-10-12 ASSESSMENT — ENCOUNTER SYMPTOMS
COUGH: 0
RHINORRHEA: 0
CHEST TIGHTNESS: 0
ABDOMINAL PAIN: 1
DIARRHEA: 0
SHORTNESS OF BREATH: 0
ANAL BLEEDING: 1
BACK PAIN: 0
VOMITING: 0

## 2022-10-12 ASSESSMENT — PAIN SCALES - GENERAL
PAINLEVEL_OUTOF10: 7

## 2022-10-12 ASSESSMENT — PAIN - FUNCTIONAL ASSESSMENT: PAIN_FUNCTIONAL_ASSESSMENT: 0-10

## 2022-10-12 ASSESSMENT — LIFESTYLE VARIABLES
HOW MANY STANDARD DRINKS CONTAINING ALCOHOL DO YOU HAVE ON A TYPICAL DAY: PATIENT DOES NOT DRINK
HOW OFTEN DO YOU HAVE A DRINK CONTAINING ALCOHOL: NEVER

## 2022-10-12 NOTE — H&P
Hospital Medicine History & Physical      PCP: Griselda Beath, MD    Date of Admission: 10/12/2022    Date of Service: Pt seen/examined on 10/12/2022     Chief Complaint:    Chief Complaint   Patient presents with    Abdominal Pain     Pt c/o abdominal pain starting last night, pt started having large amounts of rectal bleeding since about 0400, pt states she is going through multiple pads         History Of Present Illness: The patient is a 64 y.o. female with GERD, arthritis, and chronic back pain who presents to [facility] with c/o abdominal pain. She states onset was last night. The pain was severe and felt like she got beat up. Located from umbilicus to lower abdomen. Associated with diarrhea and having bright red blood. She states the pain improved when this happened. She ended up slipping and falling in the blood. She then was dizzy and light-headed. BP hypotensive. Labs with leukocytosis. Imaging with acute colitis of the distal transverse through descending colon, 3.6 cm infrarenal abdominal aortic aneurysm. Admitted to PCU on tele. GI consulted. Past Medical History:        Diagnosis Date    Acid reflux     ADD (attention deficit disorder)     Arthritis     Back pain     chronic    Nausea     PONV (postoperative nausea and vomiting)     Spinal cord stimulator status     left hip/back - pt does not use    Wears dentures     Upper       Past Surgical History:        Procedure Laterality Date    BACK SURGERY  1997     lumbar x2    CHOLECYSTECTOMY      HAND SURGERY Bilateral     thumb joint replacement    HYSTERECTOMY (CERVIX STATUS UNKNOWN)      KNEE ARTHROSCOPY Right     x2    KNEE SURGERY Right 11/21/2017    RIGHT TOTAL KNEE REPLACEMENT     POLYPECTOMY Bilateral 10/03/2022    vocal cords       Medications Prior to Admission:    Prior to Admission medications    Medication Sig Start Date End Date Taking?  Authorizing Provider   atorvastatin (LIPITOR) 40 MG tablet TAKE 1 TABLET BY MOUTH EVERY DAY 9/22/22   Mayela Obregon MD   estrogens, conjugated,-methyltestosterone (ESTRATEST) 1.25-2.5 MG per tablet Take 1 tablet by mouth. Historical Provider, MD   gabapentin (NEURONTIN) 300 MG capsule Take 1,200 mg by mouth. Historical Provider, MD   aspirin 325 MG EC tablet Take 325 mg by mouth 12/20/17   Historical Provider, MD   pantoprazole (PROTONIX) 40 MG tablet Take 40 mg by mouth    Historical Provider, MD   cyclobenzaprine (FLEXERIL) 10 MG tablet Take 10 mg by mouth    Historical Provider, MD   amphetamine-dextroamphetamine (ADDERALL) 10 MG tablet Take 30 mg by mouth daily. Historical Provider, MD   promethazine (PHENERGAN) 25 MG tablet TAKE 1 TABLET BY MOUTH 3 TIMES A DAY AS NEEDED 4/11/17   Historical Provider, MD       Allergies:  Patient has no known allergies. Social History:     TOBACCO:   reports that she has been smoking cigarettes. She has been smoking an average of .5 packs per day. She has never used smokeless tobacco.  ETOH:   reports no history of alcohol use. Family History:   Positive as follows:        Problem Relation Age of Onset    High Blood Pressure Mother     Heart Disease Maternal Grandfather        REVIEW OF SYSTEMS:       Constitutional: Negative for fever   Respiratory: Negative  for dyspnea, cough   Cardiovascular: Negative for chest pain   Gastrointestinal: +abdominal pain, diarrhea, BRBPR  Genitourinary: Negative for hematuria   Musculoskeletal: +chronic arthralgias   Skin: Negative for rash   Neurological: Negative for syncope   Psychiatric/Behavorial: Negative for anxiety    PHYSICAL EXAM:    BP (!) 96/40   Pulse 70   Temp 98.7 °F (37.1 °C) (Oral)   Resp 15   Ht 5' 6\" (1.676 m)   Wt 128 lb (58.1 kg)   SpO2 97%   BMI 20.66 kg/m²     Gen: No distress. Alert. Eyes: PERRL. No sclera icterus. No conjunctival injection. ENT: No discharge. Pharynx clear. Neck: Trachea midline. Resp: No accessory muscle use. No crackles. No wheezes.  No rhonchi. CV: Regular rate. Regular rhythm. No murmur. No rub. No edema. GI: + Tender. Non-distended. Normal bowel sounds. No hernia. Skin: Warm and dry. No nodule on exposed extremities. No rash on exposed extremities. M/S: No cyanosis. No joint deformity. No clubbing. Neuro: Awake. Grossly nonfocal    Psych: Oriented x 3. No anxiety or agitation. CBC:   Recent Labs     10/12/22  1101   WBC 31.2*   HGB 17.1*   HCT 51.9*   MCV 94.2        BMP:   Recent Labs     10/12/22  1101      K 4.4      CO2 27   BUN 18   CREATININE 1.0     LIVER PROFILE:   Recent Labs     10/12/22  1101   AST 30   ALT 35   BILITOT 0.9   ALKPHOS 181*         U/A:    Lab Results   Component Value Date/Time    COLORU Yellow 12/02/2017 08:30 PM    CLARITYU Clear 12/02/2017 08:30 PM    SPECGRAV <=1.005 12/02/2017 08:30 PM    LEUKOCYTESUR Negative 12/02/2017 08:30 PM    BLOODU Negative 12/02/2017 08:30 PM    GLUCOSEU Negative 12/02/2017 08:30 PM       CULTURES  COVID: not detected  Blood: pending     EKG:  I have reviewed the EKG with the following interpretation:   N/A    RADIOLOGY  CTA ABDOMEN PELVIS W WO CONTRAST   Final Result   1. Acute colitis involving the distal transverse through descending colon   favoring an infectious rather than inflammatory etiology. 2. Small amount of complex pelvic ascites. 3. 3.6 cm infrarenal abdominal aortic aneurysm. Recommend CTA of the abdomen   pelvis in 2 years. RECOMMENDATION:   For management of fusiform AAA:      3.5-3.9 cm AAA, recommend follow-up every 2 years. Final Pathologic Diagnosis   A. Right vocal cord polyp:   -          Benign vocal cord nodule/polyp.   -     Negative for high-grade dysplasia or malignancy. B. Left vocal cord polyp:      -   Benign vocal cord nodule/polyp.      -   Negative for high-grade dysplasia or malignancy.        Principal Problem:    Bright red blood per rectum  Resolved Problems:    * No resolved hospital problems. *        ASSESSMENT/PLAN:  GI Bleed  Acute infectious colitis  -admitted to PCU on tele. GI consulted  -IV Zosyn  -IVF's  -pain control: morphine prn  -monitor H/H. IV PPI BID    Leukocytosis   -related to above  -continue Zosyn, IVF's  -monitor CBC    3.6 cm infrarenal abdominal aortic aneurysm  -needs Repeat CTA abdomen/pelvis in 2 years  -patient aware. GERD  -IV PPI BID    Chronic back pain    H/o carotid artery occlusion  H/o CVA  -on aspirin, Atorvastatin  -holding aspirin due to bleeding. Cerebral aneurysm  -F/w Dr. Jennifer Brooks    Hyperlipidemia  -on Atorvastatin    Vocal cord polyps  -had excision of lesion on 10/3    DVT Prophylaxis: SCD's  Diet: ADULT DIET;  Clear Liquid  Code Status: Prior    [facility]  10/12/2022

## 2022-10-12 NOTE — DISCHARGE INSTRUCTIONS
3.6 cm infrarenal abdominal aortic aneurysm  -needs Repeat CTA abdomen/pelvis in 2 years  -patient aware.     Start ASA after 3 days

## 2022-10-12 NOTE — PLAN OF CARE
Care Plan, Education, Fall Risk, Tye Scale, and Lift Assessment complete. Patient is resting and reports no needs at this time.      Problem: Discharge Planning  Goal: Discharge to home or other facility with appropriate resources  Outcome: Progressing     Problem: Pain  Goal: Verbalizes/displays adequate comfort level or baseline comfort level  Outcome: Progressing     Problem: ABCDS Injury Assessment  Goal: Absence of physical injury  Outcome: Progressing

## 2022-10-12 NOTE — ED PROVIDER NOTES
Emergency Department Provider Note  Location: [place] [address] ED  10/12/2022     Patient Identification  Remy Resendiz is a 64 y.o. female    Chief Complaint  Abdominal Pain (Pt c/o abdominal pain starting last night, pt started having large amounts of rectal bleeding since about 0400, pt states she is going through multiple pads)      HPI    (History provided by patient)    Patient is a 64 y.o. female with a significant PMHx of reflux, multiple previous surgeries including cholecystectomy, hysterectomy, and recent vocal cord polypectomy, that presents the emergency department today with concerns of bright red blood per rectum starting yesterday; said she had significant abdominal pain yesterday evening, and when she sat up to go to the bathroom to help relieve the pain, she had \"a lot,\" of blood come out of her down her legs and onto the carpet. She actually fell in the blood. She went to the bathroom, and it seemed to slow down, and she went back to bed, though was unable to sleep secondary to pain. Patient says her blood pressure is always slightly low, but does feel lightheaded today in the ER. Over the course of the evening and overnight, patient continued to have bright red blood per rectum on the toilet paper, showing me pictures of bright red blood on toilet paper, moderate amount. Continue to have abdominal pain this morning, prompting her to come to the ER. She has a history of hemorrhoids, but they never felt like this, and does not feel like she has had one in years. Denies any nausea. No chest pain, shortness of breath, headaches, vision changes. Patient had recent polypectomy, no concerns except her voice is still hoarse. No abdominal trauma.       I have reviewed the following nursing documentation:  Allergies: No Known Allergies    Past medical history:  has a past medical history of Acid reflux, ADD (attention deficit disorder), Arthritis, Back pain, Nausea, PONV (postoperative nausea and vomiting), Spinal cord stimulator status, and Wears dentures. Past surgical history:  has a past surgical history that includes Hysterectomy; Knee arthroscopy (Right); Hand surgery (Bilateral); Cholecystectomy; back surgery (1997); knee surgery (Right, 11/21/2017); and polypectomy (Bilateral, 10/03/2022). Home medications:   Prior to Admission medications    Medication Sig Start Date End Date Taking? Authorizing Provider   atorvastatin (LIPITOR) 40 MG tablet TAKE 1 TABLET BY MOUTH EVERY DAY 9/22/22   Derek Howard MD   estrogens, conjugated,-methyltestosterone (ESTRATEST) 1.25-2.5 MG per tablet Take 1 tablet by mouth. Historical Provider, MD   gabapentin (NEURONTIN) 300 MG capsule Take 1,200 mg by mouth. Historical Provider, MD   aspirin 325 MG EC tablet Take 325 mg by mouth 12/20/17   Historical Provider, MD   pantoprazole (PROTONIX) 40 MG tablet Take 40 mg by mouth    Historical Provider, MD   cyclobenzaprine (FLEXERIL) 10 MG tablet Take 10 mg by mouth    Historical Provider, MD   amphetamine-dextroamphetamine (ADDERALL) 10 MG tablet Take 30 mg by mouth daily. Historical Provider, MD   promethazine (PHENERGAN) 25 MG tablet TAKE 1 TABLET BY MOUTH 3 TIMES A DAY AS NEEDED 4/11/17   Historical Provider, MD       Social history:  reports that she has been smoking cigarettes. She has been smoking an average of .5 packs per day. She has never used smokeless tobacco. She reports that she does not drink alcohol and does not use drugs. Family history:    Family History   Problem Relation Age of Onset    High Blood Pressure Mother     Heart Disease Maternal Grandfather      ROS  Review of Systems   Constitutional:  Negative for activity change, appetite change, fatigue and fever. HENT:  Negative for congestion and rhinorrhea. Respiratory:  Negative for cough, chest tightness and shortness of breath. Cardiovascular:  Negative for chest pain and leg swelling.    Gastrointestinal:  Positive for abdominal pain and anal bleeding. Negative for diarrhea and vomiting. Genitourinary:  Negative for dysuria, flank pain and pelvic pain. Musculoskeletal:  Negative for back pain and neck pain. Skin:  Negative for rash and wound. Exam  Vitals:    10/12/22 1031 10/12/22 1132 10/12/22 1201 10/12/22 1336   BP: (!) 97/48 108/61 107/75 (!) 88/49   Pulse:  78 71 68   Resp:  18 16 13   Temp:       TempSrc:       SpO2:  98%  93%   Weight:       Height:             Physical Exam  Vitals reviewed. Constitutional:       General: She is not in acute distress. Appearance: Normal appearance. She is ill-appearing (Appears uncomfrotable but speaking in full sentences). HENT:      Head: Normocephalic and atraumatic. Right Ear: External ear normal.      Left Ear: External ear normal.      Nose: Nose normal. No congestion. Mouth/Throat:      Mouth: Mucous membranes are moist.      Pharynx: Oropharynx is clear. Eyes:      General:         Right eye: No discharge. Left eye: No discharge. Conjunctiva/sclera: Conjunctivae normal.   Cardiovascular:      Rate and Rhythm: Normal rate and regular rhythm. Pulmonary:      Effort: Pulmonary effort is normal. No respiratory distress. Breath sounds: Normal breath sounds. Abdominal:      General: Abdomen is flat. There is no distension. Palpations: Abdomen is soft. Tenderness: There is generalized abdominal tenderness. There is no guarding or rebound. Musculoskeletal:         General: No swelling or tenderness. Cervical back: Normal range of motion and neck supple. Skin:     General: Skin is warm and dry. Neurological:      General: No focal deficit present. Mental Status: She is alert and oriented to person, place, and time.    Psychiatric:         Mood and Affect: Mood normal.         Behavior: Behavior normal.     ED Course    ED Medication Orders (From admission, onward)      Start Ordered     Status Ordering Provider    10/12/22 1415 10/12/22 1409  vancomycin 1000 mg IVPB in 250 mL D5W addavial  ONCE        Question:  Antimicrobial Indications  Answer:  Intra-Abdominal Infection    Ordered SURESH DOSHI    10/12/22 1415 10/12/22 1409  morphine sulfate (PF) injection 4 mg  ONCE         Ordered SURESH DOSHI    10/12/22 1201 10/12/22 1202  iopamidol (ISOVUE-370) 76 % injection 100 mL  IMG ONCE PRN         Last MAR action: Given - by SARAH GUSTAFSON on 10/12/22 at 1221 SURESH DOSHI    10/12/22 1200 10/12/22 1147  piperacillin-tazobactam (ZOSYN) 3,375 mg in sodium chloride 0.9 % 100 mL IVPB (mini-bag)  ONCE        Question:  Antimicrobial Indications  Answer:  Intra-Abdominal Infection    Acknowledged SURESH DOSHI    10/12/22 1045 10/12/22 1041  morphine sulfate (PF) injection 4 mg  ONCE         Last MAR action: Given - by Radha Lazo on 10/12/22 at 1106 SURESH DOSHI    10/12/22 1045 10/12/22 1041  0.9 % sodium chloride bolus  ONCE         Last MAR action: Stopped - by Radha Lazo on 10/12/22 at 1339 SURESH DOSHI    10/12/22 1041 10/12/22 1041  ondansetron (ZOFRAN) injection 4 mg  EVERY 6 HOURS PRN         Last MAR action: Given - by Radha Lazo on 10/12/22 at 1104 [facility]            Radiology  CTA ABDOMEN PELVIS W WO CONTRAST    Result Date: 10/12/2022  EXAMINATION: CTA OF THE ABDOMEN AND PELVIS WITH AND WITHOUT CONTRAST 10/12/2022 12:08 pm: TECHNIQUE: CTA of the abdomen and pelvis was performed without and with the administration of intravenous contrast. Multiplanar reformatted images are provided for review. MIP images are provided for review. Automated exposure control, iterative reconstruction, and/or weight based adjustment of the mA/kV was utilized to reduce the radiation dose to as low as reasonably achievable. COMPARISON: None.  HISTORY: ORDERING SYSTEM PROVIDED HISTORY: GI bleed, BRBPR, slight hypotension TECHNOLOGIST PROVIDED HISTORY: Reason for exam:->GI bleed, BRBPR, slight hypotension Decision Support Exception - unselect if not a suspected or confirmed emergency medical condition->Emergency Medical Condition (MA) Reason for Exam: bleeding from rectum, no h/o bowel issues in past FINDINGS: Lower Chest: There is bibasilar atelectasis. Organs: The liver, spleen, pancreas, adrenal glands and kidneys are unremarkable. Status post cholecystectomy with mild intrahepatic ductal dilatation. GI/Bowel: There is no bowel obstruction. The appendix is within normal limits. However, there is moderate continuous circumferential wall thickening involving the distal transverse through distal descending colon with mild pericolonic inflammation due to acute colitis. There is no contrast extravasation to suggest active hemorrhage. Pelvis: Status post hysterectomy. Peritoneum/Retroperitoneum: A small amount of complex ascites is present in the pelvis. There is no adenopathy. Moderate atherosclerosis involves the abdominal aorta. However, there is an infrarenal fusiform abdominal aortic aneurysm measuring 3.3 x 3.6 cm Bones/Soft Tissues: Degenerative changes involve the thoracolumbar spine and bilateral hips. A spinal stimulator is insitu. 1. Acute colitis involving the distal transverse through descending colon favoring an infectious rather than inflammatory etiology. 2. Small amount of complex pelvic ascites. 3. 3.6 cm infrarenal abdominal aortic aneurysm. Recommend CTA of the abdomen pelvis in 2 years. RECOMMENDATION: For management of fusiform AAA: 3.5-3.9 cm AAA, recommend follow-up every 2 years.       Labs  Results for orders placed or performed during the hospital encounter of 10/12/22   CBC with Auto Differential   Result Value Ref Range    WBC 31.2 (H) 4.0 - 11.0 K/uL    RBC 5.52 (H) 4.00 - 5.20 M/uL    Hemoglobin 17.1 (H) 12.0 - 16.0 g/dL    Hematocrit 51.9 (H) 36.0 - 48.0 %    MCV 94.2 80.0 - 100.0 fL    MCH 31.0 26.0 - 34.0 pg    MCHC 32.9 31.0 - 36.0 g/dL    RDW 14.3 12.4 - 15.4 %    Platelets 547 742 - 811 K/uL    MPV 9.2 5.0 - 10.5 fL    Neutrophils % 90.7 %    Lymphocytes % 4.9 %    Monocytes % 3.6 %    Eosinophils % 0.0 %    Basophils % 0.8 %    Neutrophils Absolute 28.3 (H) 1.7 - 7.7 K/uL    Lymphocytes Absolute 1.5 1.0 - 5.1 K/uL    Monocytes Absolute 1.1 0.0 - 1.3 K/uL    Eosinophils Absolute 0.0 0.0 - 0.6 K/uL    Basophils Absolute 0.2 0.0 - 0.2 K/uL   CMP w/ Reflex to MG   Result Value Ref Range    Sodium 139 136 - 145 mmol/L    Potassium reflex Magnesium 4.4 3.5 - 5.1 mmol/L    Chloride 101 99 - 110 mmol/L    CO2 27 21 - 32 mmol/L    Anion Gap 11 3 - 16    Glucose 120 (H) 70 - 99 mg/dL    BUN 18 7 - 20 mg/dL    Creatinine 1.0 0.6 - 1.1 mg/dL    GFR Non- 57 (A) >60    GFR African American >60 >60    Calcium 9.5 8.3 - 10.6 mg/dL    Total Protein 6.7 6.4 - 8.2 g/dL    Albumin 4.2 3.4 - 5.0 g/dL    Albumin/Globulin Ratio 1.7 1.1 - 2.2    Total Bilirubin 0.9 0.0 - 1.0 mg/dL    Alkaline Phosphatase 181 (H) 40 - 129 U/L    ALT 35 10 - 40 U/L    AST 30 15 - 37 U/L   Lactate, Sepsis   Result Value Ref Range    Lactic Acid, Sepsis 0.8 0.4 - 1.9 mmol/L   TYPE AND SCREEN   Result Value Ref Range    ABO/Rh O NEG     Antibody Screen NEG        Procedures  Procedures        MDM  Patient seen and evaluated. Relevant records reviewed. - Patient is a 64 y.o. female with multiple episodes of bright red blood per rectum starting yesterday evening overnight with abdominal pain. Additionally, started to get lightheaded, prompting her to come to the ER. Patient appears uncomfortable. Blood pressure 97/48 concerning for wide pulse pressure, slight hypotension, and tachycardic to  on monitor. Type and screen, GI work-up pending. We will start with IV fluids. Not short of breath. 2:10 PM  Blood cell count at 31.2. Patient was intermittently tachycardic from . Still having abdominal pain. Will obtain sepsis labs, blood cultures, lactic acid. Afterwards, will initiate Zosyn for intra-abdominal infectious cysts mission, despite not having CT abdomen and back yet. Initially I thought her fast heart rate and low blood pressure were secondary to bright red blood per rectum, however it appears that she could be septic. Is not on steroids to account for leukocytosis. She is hemoconcentrated at 17.1. Getting IV fluids already. CT abdomen pelvis shows acute colitis from the distal transverse through the descending colon favoring infectious process. Spectrum antibiotics initiated. Patient agreeable with admission plan. Abdominal pain has improved with 2 doses of morphine today in the ER. Blood pressure remains slightly low, however stable. Hospitalist able to place admission orders. Stable for transfer to the floor. Medications   ondansetron (ZOFRAN) injection 4 mg (4 mg IntraVENous Given 10/12/22 1104)   piperacillin-tazobactam (ZOSYN) 3,375 mg in sodium chloride 0.9 % 100 mL IVPB (mini-bag) (has no administration in time range)   vancomycin 1000 mg IVPB in 250 mL D5W addavial (has no administration in time range)   morphine sulfate (PF) injection 4 mg (has no administration in time range)   morphine sulfate (PF) injection 4 mg (4 mg IntraVENous Given 10/12/22 1106)   0.9 % sodium chloride bolus (0 mLs IntraVENous Stopped 10/12/22 1339)   iopamidol (ISOVUE-370) 76 % injection 100 mL (100 mLs IntraVENous Given 10/12/22 1221)       - I discussed the results, including any incidental findings, with patient and family member patient and sister. Questions answered. Patient/family agreeable to plan and express understanding of plan. Disposition:  Admit to telemetry in stable condition. Is this patient to be included in the SEP-1 Core Measure due to severe sepsis or septic shock?    No   Exclusion criteria - the patient is NOT to be included for SEP-1 Core Measure due to:  May have criteria for sepsis, but does not meet criteria for severe sepsis or septic shock    Consult this encounter: IM    Clinical Impression:  No diagnosis found. Blood pressure (!) 88/49, pulse 68, temperature 98.7 °F (37.1 °C), temperature source Oral, resp. rate 13, height 5' 6\" (1.676 m), weight 128 lb (58.1 kg), SpO2 93 %, not currently breastfeeding. Farhan Doshi, , am the primary attending of record and contributed the majority of evaluation and treatment of emergent care for this encounter. Total critical care time is 45 minutes, which excludes separately billable procedures and updating family. Time spent is specifically for management of the presenting complaint and symptoms initially, direct bedside care, reevaluation, review of records, and consultation. There was a high probability of clinically significant life-threatening deterioration in the patient's condition, which required my urgent intervention. This chart was generated in part by using Dragon Dictation system and may contain errors related to that system including errors in grammar, punctuation, and spelling, as well as words and phrases that may be inappropriate. If there are any questions or concerns please feel free to contact the dictating provider for clarification.      SURESH DOSHI,   [address]  10/12/22 1700

## 2022-10-12 NOTE — PROGRESS NOTES
Consult has been called to Dr. Belia Bhatia on 10/12/22.  Spoke with Omero Goss. 4:22 PM    Natanael Sunshine  10/12/2022

## 2022-10-12 NOTE — PROGRESS NOTES
Patient admitted to room 307 from ED. Patient oriented to room, call light, bed rails, phone, lights and bathroom. Patient instructed about the schedule of the day including: vital sign frequency, lab draws, possible tests, frequency of MD and staff rounds, daily weights, I &O's and prescribed diet. Telemetry box in place, patient aware of placement and reason. Bed locked, in lowest position, side rails up 2/4, call light within reach. Recliner Assessment  Patient is able to demonstrate the ability to move from a reclining position to an upright position within the recliner. 4 Eyes Skin Assessment     The patient is being assess for   Admission    I agree that 2 RN's have performed a thorough Head to Toe Skin Assessment on the patient. ALL assessment sites listed below have been assessed. Areas assessed for pressure by both nurses:   [x]   Head, Face, and Ears   [x]   Shoulders, Back, and Chest, Abdomen  [x]   Arms, Elbows, and Hands   [x]   Coccyx, Sacrum, and Ischium  [x]   Legs, Feet, and Heels  No skin concerns      Skin Assessed Under all Medical Devices by both nurses:  N/A           All Mepilex Borders were peeled back and area peeked at by both nurses:  No: n/a  Please list where Mepilex Borders are located:  n/a             **SHARE this note so that the co-signing nurse is able to place an eSignature**    Co-signer eSignature: Electronically signed by Mariangel Vivar RN on 10/12/22 at 5:58 PM EDT    Does the Patient have Skin Breakdown related to pressure?   No          Tye Prevention initiated:  No   Wound Care Orders initiated:  No      [facility] nurse consulted for Pressure Injury (Stage 3,4, Unstageable, DTI, NWPT, Complex wounds)and New or Established Ostomies:  No      Primary Nurse eSignature: Electronically signed by Gideon Kinney RN on 10/12/22 at 5:47 PM EDT

## 2022-10-13 LAB
ANION GAP SERPL CALCULATED.3IONS-SCNC: 7 MMOL/L (ref 3–16)
APTT: 29.8 SEC (ref 23–34.3)
BUN BLDV-MCNC: 10 MG/DL (ref 7–20)
CALCIUM SERPL-MCNC: 8.3 MG/DL (ref 8.3–10.6)
CHLORIDE BLD-SCNC: 107 MMOL/L (ref 99–110)
CO2: 25 MMOL/L (ref 21–32)
CREAT SERPL-MCNC: 0.7 MG/DL (ref 0.6–1.1)
GFR AFRICAN AMERICAN: >60
GFR NON-AFRICAN AMERICAN: >60
GLUCOSE BLD-MCNC: 84 MG/DL (ref 70–99)
HCT VFR BLD CALC: 37.6 % (ref 36–48)
HCT VFR BLD CALC: 39.3 % (ref 36–48)
HCT VFR BLD CALC: 40.5 % (ref 36–48)
HCT VFR BLD CALC: 40.9 % (ref 36–48)
HEMOGLOBIN: 12.6 G/DL (ref 12–16)
HEMOGLOBIN: 12.9 G/DL (ref 12–16)
HEMOGLOBIN: 13.1 G/DL (ref 12–16)
HEMOGLOBIN: 13.3 G/DL (ref 12–16)
INR BLD: 1.11 (ref 0.87–1.14)
IRON SATURATION: 54 % (ref 15–50)
IRON: 106 UG/DL (ref 37–145)
POTASSIUM REFLEX MAGNESIUM: 4.4 MMOL/L (ref 3.5–5.1)
PROTHROMBIN TIME: 14.1 SEC (ref 11.7–14.5)
SODIUM BLD-SCNC: 139 MMOL/L (ref 136–145)
TOTAL IRON BINDING CAPACITY: 197 UG/DL (ref 260–445)

## 2022-10-13 PROCEDURE — 83540 ASSAY OF IRON: CPT

## 2022-10-13 PROCEDURE — 85610 PROTHROMBIN TIME: CPT

## 2022-10-13 PROCEDURE — 85014 HEMATOCRIT: CPT

## 2022-10-13 PROCEDURE — 6370000000 HC RX 637 (ALT 250 FOR IP): Performed by: PHYSICIAN ASSISTANT

## 2022-10-13 PROCEDURE — 80048 BASIC METABOLIC PNL TOTAL CA: CPT

## 2022-10-13 PROCEDURE — 6360000002 HC RX W HCPCS: Performed by: INTERNAL MEDICINE

## 2022-10-13 PROCEDURE — 36415 COLL VENOUS BLD VENIPUNCTURE: CPT

## 2022-10-13 PROCEDURE — 6370000000 HC RX 637 (ALT 250 FOR IP): Performed by: NURSE PRACTITIONER

## 2022-10-13 PROCEDURE — 6360000002 HC RX W HCPCS

## 2022-10-13 PROCEDURE — C9113 INJ PANTOPRAZOLE SODIUM, VIA: HCPCS | Performed by: INTERNAL MEDICINE

## 2022-10-13 PROCEDURE — 85018 HEMOGLOBIN: CPT

## 2022-10-13 PROCEDURE — 99232 SBSQ HOSP IP/OBS MODERATE 35: CPT | Performed by: INTERNAL MEDICINE

## 2022-10-13 PROCEDURE — 2500000003 HC RX 250 WO HCPCS: Performed by: INTERNAL MEDICINE

## 2022-10-13 PROCEDURE — 85730 THROMBOPLASTIN TIME PARTIAL: CPT

## 2022-10-13 PROCEDURE — 2580000003 HC RX 258

## 2022-10-13 PROCEDURE — 2060000000 HC ICU INTERMEDIATE R&B

## 2022-10-13 PROCEDURE — 83550 IRON BINDING TEST: CPT

## 2022-10-13 RX ORDER — METRONIDAZOLE 500 MG/100ML
500 INJECTION, SOLUTION INTRAVENOUS EVERY 8 HOURS
Status: DISCONTINUED | OUTPATIENT
Start: 2022-10-13 | End: 2022-10-14 | Stop reason: HOSPADM

## 2022-10-13 RX ORDER — CIPROFLOXACIN 2 MG/ML
400 INJECTION, SOLUTION INTRAVENOUS EVERY 12 HOURS
Status: DISCONTINUED | OUTPATIENT
Start: 2022-10-13 | End: 2022-10-14 | Stop reason: HOSPADM

## 2022-10-13 RX ORDER — PANTOPRAZOLE SODIUM 40 MG/10ML
40 INJECTION, POWDER, LYOPHILIZED, FOR SOLUTION INTRAVENOUS
Status: DISCONTINUED | OUTPATIENT
Start: 2022-10-13 | End: 2022-10-14 | Stop reason: HOSPADM

## 2022-10-13 RX ORDER — SACCHAROMYCES BOULARDII 250 MG
250 CAPSULE ORAL 2 TIMES DAILY
Status: DISCONTINUED | OUTPATIENT
Start: 2022-10-13 | End: 2022-10-14 | Stop reason: HOSPADM

## 2022-10-13 RX ADMIN — SODIUM CHLORIDE: 9 INJECTION, SOLUTION INTRAVENOUS at 06:38

## 2022-10-13 RX ADMIN — GABAPENTIN 1200 MG: 400 CAPSULE ORAL at 20:30

## 2022-10-13 RX ADMIN — CIPROFLOXACIN 400 MG: 2 INJECTION, SOLUTION INTRAVENOUS at 11:40

## 2022-10-13 RX ADMIN — SODIUM CHLORIDE: 9 INJECTION, SOLUTION INTRAVENOUS at 13:14

## 2022-10-13 RX ADMIN — CIPROFLOXACIN 400 MG: 2 INJECTION, SOLUTION INTRAVENOUS at 20:29

## 2022-10-13 RX ADMIN — RDII 250 MG CAPSULE 250 MG: at 11:52

## 2022-10-13 RX ADMIN — SODIUM CHLORIDE: 9 INJECTION, SOLUTION INTRAVENOUS at 20:26

## 2022-10-13 RX ADMIN — PANTOPRAZOLE SODIUM 40 MG: 40 INJECTION, POWDER, FOR SOLUTION INTRAVENOUS at 08:46

## 2022-10-13 RX ADMIN — RDII 250 MG CAPSULE 250 MG: at 20:30

## 2022-10-13 RX ADMIN — MORPHINE SULFATE 2 MG: 2 INJECTION, SOLUTION INTRAMUSCULAR; INTRAVENOUS at 11:44

## 2022-10-13 RX ADMIN — Medication 10 ML: at 08:49

## 2022-10-13 RX ADMIN — MORPHINE SULFATE 2 MG: 2 INJECTION, SOLUTION INTRAMUSCULAR; INTRAVENOUS at 20:37

## 2022-10-13 RX ADMIN — SODIUM CHLORIDE: 9 INJECTION, SOLUTION INTRAVENOUS at 09:06

## 2022-10-13 RX ADMIN — METRONIDAZOLE 500 MG: 500 INJECTION, SOLUTION INTRAVENOUS at 09:07

## 2022-10-13 RX ADMIN — Medication 10 ML: at 20:38

## 2022-10-13 RX ADMIN — SODIUM CHLORIDE: 9 INJECTION, SOLUTION INTRAVENOUS at 11:39

## 2022-10-13 RX ADMIN — MORPHINE SULFATE 2 MG: 2 INJECTION, SOLUTION INTRAMUSCULAR; INTRAVENOUS at 05:39

## 2022-10-13 RX ADMIN — ATORVASTATIN CALCIUM 40 MG: 40 TABLET, FILM COATED ORAL at 08:46

## 2022-10-13 RX ADMIN — METRONIDAZOLE 500 MG: 500 INJECTION, SOLUTION INTRAVENOUS at 18:07

## 2022-10-13 RX ADMIN — WITCH HAZEL: 500 SOLUTION RECTAL; TOPICAL at 11:38

## 2022-10-13 ASSESSMENT — PAIN DESCRIPTION - DESCRIPTORS
DESCRIPTORS: ACHING;NAGGING;POUNDING
DESCRIPTORS: ACHING;SORE;DISCOMFORT

## 2022-10-13 ASSESSMENT — PAIN SCALES - GENERAL
PAINLEVEL_OUTOF10: 6
PAINLEVEL_OUTOF10: 7
PAINLEVEL_OUTOF10: 7

## 2022-10-13 ASSESSMENT — PAIN DESCRIPTION - ORIENTATION
ORIENTATION: LOWER
ORIENTATION: LOWER

## 2022-10-13 ASSESSMENT — PAIN DESCRIPTION - LOCATION
LOCATION: ABDOMEN

## 2022-10-13 ASSESSMENT — PAIN DESCRIPTION - FREQUENCY: FREQUENCY: CONTINUOUS

## 2022-10-13 ASSESSMENT — PAIN - FUNCTIONAL ASSESSMENT: PAIN_FUNCTIONAL_ASSESSMENT: ACTIVITIES ARE NOT PREVENTED

## 2022-10-13 ASSESSMENT — PAIN DESCRIPTION - PAIN TYPE: TYPE: ACUTE PAIN

## 2022-10-13 ASSESSMENT — PAIN DESCRIPTION - ONSET: ONSET: ON-GOING

## 2022-10-13 NOTE — PLAN OF CARE
Problem: Discharge Planning  Goal: Discharge to home or other facility with appropriate resources  10/13/2022 1640 by Brennan Vinson RN  Outcome: Progressing  10/13/2022 0509 by Katarzyna Biggs RN  Outcome: Progressing     Problem: Pain  Goal: Verbalizes/displays adequate comfort level or baseline comfort level  10/13/2022 1640 by Brennan Vinson RN  Outcome: Progressing  10/13/2022 0509 by Katarzyna Biggs RN  Outcome: Progressing     Problem: ABCDS Injury Assessment  Goal: Absence of physical injury  10/13/2022 1640 by Brennan Vinson RN  Outcome: Progressing  10/13/2022 0509 by Katarzyna Biggs RN  Outcome: Progressing     Problem: Chronic Conditions and Co-morbidities  Goal: Patient's chronic conditions and co-morbidity symptoms are monitored and maintained or improved  Outcome: Progressing

## 2022-10-13 NOTE — PROGRESS NOTES
Shift assessment complete, see flowsheet. Pt A&O x4. Denies feelings of SOB. Diet advanced to clear liquids at this time. Scheduled morning medications administered per eMAR. Pt expresses concerns regarding potentially loosing wedding rings due. RN obtained small ziploc bag, placed pt label on bag with rings inside and locked pt belongings in pt med  pt room. No further needs expressed by pt at this time. Call light left within reach.

## 2022-10-13 NOTE — FLOWSHEET NOTE
10/13/22 1144   Pain Assessment   Pain Assessment 0-10   Pain Level 7   Patient's Stated Pain Goal 2   Pain Location Abdomen   Pain Orientation Lower   Pain Descriptors Aching;Sore;Discomfort   Functional Pain Assessment Activities are not prevented   Pain Type Acute pain   Pain Frequency Continuous   Pain Onset On-going   Non-Pharmaceutical Pain Intervention(s) Repositioned; Heat applied; Rest       PRN morphine given at this time for abdominal pain.  Heat pack provided per pt request.

## 2022-10-13 NOTE — ACP (ADVANCE CARE PLANNING)
discussed [address] and Living Will documents, as well as the state hierarchy for decision makers, with patient. Patient states that her  has had four strokes, and she is uncertain about appointing him as her proxy. She has three adult children, but expresses concern that they would not agree about pt's care if they had to make decisions as a majority. Consequently, patient shared her intention to name her son Odalys Lepe as her primary agent. Patient's  arrived during the encounter, and patient requested a follow up visit for completion of ACP documents when he is not present. Spiritual Care will continue discussion and completion, if desired, on 10/14.

## 2022-10-13 NOTE — CARE COORDINATION
Case Management Assessment  Initial Evaluation      Patient Name: Alisia Murphy  YOB: 1965  Diagnosis: Bright red blood per rectum [K62.5]  Date / Time: 10/12/2022 10:23 AM    Admission status/Date:  10/12/2022  Chart Reviewed: Yes      Patient Interviewed: Yes   Family Interviewed:  No      Hospitalization in the last 30 days:  No      Health Care Decision Maker :   Primary Decision Maker: Stuart Cavazos F - Spouse - [phone]    Secondary Decision Maker: Jannet Cavazos - Child - [phone]    (CM - must 1st enter selection under Navigator - emergency contact- [place] Relationship and pick relationship)   Who do you trust or have selected to make healthcare decisions for you    Current PCP: [place] required for SNF : YES       3 night stay required - NA    ADLS  Support Systems/Care Needs: Spouse/Significant Other, Children, Family Members, [facility]  Transportation: self    Meal Preparation: self    Housing  Living Arrangements: lives w/sp, IPTA  Steps: NA  Intent for return to present living arrangements: Yes  Identified Issues: [facility] with [address] : No Agency:(Services)  Type of Home Care Services: None  Passport/Waiver : No  :                      Phone Number:    Passport/Waiver Services: NA          Durable Medical Equiptment   DME Provider: JIM  Equipment: JIM  Walker___Cane___RTS___ BSC___Shower Chair___Hospital Bed___W/C____Other________  02 at ____Liter(s)---wears(frequency)_______ HHN ___ CPAP___ BiPap___   N/A____      Home O2 Use :  No    Informed of need for care provider to bring portable home O2 tank on day of discharge for nursing to connect prior to leaving:   Not Indicated  Verbalized agreement/Understanding:   Not Indicated    Community Service Affiliation  Dialysis:  No    Agency:  Location:  Dialysis Schedule:  Phone:   Fax:     Other Community Services: (ex:PT/OT,Mental Health,Wound Clinic, Cardio/Pul [address])    DISCHARGE PLAN: Explained Case Management role/services. Chart reviewed. Met with pt at bedside and explained the role of the CM. Pt is IPTA w/all ADLS. Plans to return home with spouse. No DC needs or barriers identified. Pt requesting information about advanced directives. Referral to Pastoral Care for Advanced directives and goals of care conversation. +CM following from periphery.

## 2022-10-13 NOTE — ACP (ADVANCE CARE PLANNING)
Advance Care Planning     General Advance Care Planning (ACP) Conversation    Date of Conversation: 10/12/2022  Conducted with: Patient with Decision Making Capacity    Healthcare Decision Maker:    Primary Decision Maker: Stuart Cavazos F - Spouse - [phone]    Secondary Decision Maker: Jannet Cavazos - Child - [phone]  Click here to complete Healthcare Decision Makers including selection of the Healthcare Decision Maker Relationship (ie \"Primary\"). Today we documented Decision Maker(s) consistent with Legal Next of Kin hierarchy. Content/Action Overview:  Has NO ACP documents/care preferences - information provided, considering goals and options  Reviewed DNR/DNI and patient elects Full Code (Attempt Resuscitation)  Referral to Pastoral Care for goals of care and ACP documents.        Length of Voluntary ACP Conversation in minutes:  <16 minutes (Non-Billable)    Preeti Reyes RN

## 2022-10-13 NOTE — PROGRESS NOTES
Pt resting in bed watching TV. She c/o stomach cramping. Heat pack given to pt to help with abdominal pain. Assessment complete-see flowsheet. Medications given-see MAR. Pharmacy called to update them on pt's gabapentin dosage at this time as well. Pt denies further needs, call light and bedside table within reach. Will continue to monitor.

## 2022-10-13 NOTE — PLAN OF CARE
Problem: Discharge Planning  Goal: Discharge to home or other facility with appropriate resources  10/13/2022 0509 by Keily Davis RN  Outcome: Progressing  10/12/2022 1748 by Roger Casey RN  Outcome: Progressing     Problem: Pain  Goal: Verbalizes/displays adequate comfort level or baseline comfort level  10/13/2022 0509 by Keily Davis RN  Outcome: Progressing  10/12/2022 1748 by Roger Casey RN  Outcome: Progressing     Problem: ABCDS Injury Assessment  Goal: Absence of physical injury  10/13/2022 0509 by Keily Davis RN  Outcome: Progressing  10/12/2022 1748 by Roger Casey RN  Outcome: Progressing

## 2022-10-13 NOTE — CONSULTS
Gastroenterology Consult Note    Patient:   Piter Heath   :    1965   Facility:   [facility]  Referring/PCP: Sarah Snyder MD  Date:     10/13/2022  Consultant:   Ag Calhoun PA-C      Chief Complaint   Patient presents with    Abdominal Pain     Pt c/o abdominal pain starting last night, pt started having large amounts of rectal bleeding since about 0400, pt states she is going through multiple pads        History of Present illness     64year old female with a history of HLD, GERD, chronic back pain, carotid artery occlusion, CVA, cerebral aneurysm, vocal cord polyps, s/p cholecystectomy, and infrarenal AAA presented to the ED with abdominal pain. She developed lower abdominal pain two nights ago. It was characterized as punching and cramping. She passed explosive diarrhea with bleeding around 0400 AM. She slipped and fell. She has continued to pass BRBPR. Her last BM this AM had rectal bleeding. She denies nausea, vomiting, dysphagia, heartburn, bloating, melena, decreased appetite, weight loss, weakness, fatigue, and dizziness. She takes Advil 3x per week for pain relief. Her last colonoscopy in 2017 showed benign colon polyp, and recommended repeat in 10 years. GI was consulted for evaluation of BRBPR and colitis. CT abd/pelvis showed acute colitis, infectious vs inflammatory.       Past Medical History:   Diagnosis Date    Acid reflux     ADD (attention deficit disorder)     Arthritis     Back pain     chronic    CAD (coronary artery disease)     Cerebral artery occlusion with cerebral infarction (HCC)     Hx of blood clots     Nausea     PONV (postoperative nausea and vomiting)     Spinal cord stimulator status     left hip/back - pt does not use    Wears dentures     Upper     Past Surgical History:   Procedure Laterality Date    BACK SURGERY       lumbar x2    CHOLECYSTECTOMY      HAND SURGERY Bilateral     thumb joint replacement    HYSTERECTOMY (CERVIX STATUS UNKNOWN)      KNEE ARTHROSCOPY Right     x2    KNEE SURGERY Right 11/21/2017    RIGHT TOTAL KNEE REPLACEMENT     POLYPECTOMY Bilateral 10/03/2022    vocal cords       Social:   Social History     Tobacco Use    Smoking status: Some Days     Packs/day: 0.50     Types: Cigarettes    Smokeless tobacco: Never    Tobacco comments:     E-cig   Substance Use Topics    Alcohol use: No     Family:   Family History   Problem Relation Age of Onset    High Blood Pressure Mother     Heart Disease Maternal Grandfather      No current facility-administered medications on file prior to encounter. Current Outpatient Medications on File Prior to Encounter   Medication Sig Dispense Refill    atorvastatin (LIPITOR) 40 MG tablet TAKE 1 TABLET BY MOUTH EVERY DAY 60 tablet 0    estrogens, conjugated,-methyltestosterone (ESTRATEST) 1.25-2.5 MG per tablet Take 1 tablet by mouth.      gabapentin (NEURONTIN) 300 MG capsule Take 1,200 mg by mouth nightly. aspirin 325 MG EC tablet Take 325 mg by mouth      pantoprazole (PROTONIX) 40 MG tablet Take 40 mg by mouth      cyclobenzaprine (FLEXERIL) 10 MG tablet Take 10 mg by mouth      amphetamine-dextroamphetamine (ADDERALL) 10 MG tablet Take 30 mg by mouth daily.        promethazine (PHENERGAN) 25 MG tablet TAKE 1 TABLET BY MOUTH 3 TIMES A DAY AS NEEDED  3      Infusions:    sodium chloride 5 mL/hr at 10/13/22 1479    sodium chloride 150 mL/hr at 10/13/22 9887     PRN Medications: witch hazel-glycerin, sodium chloride flush, sodium chloride, ondansetron **OR** ondansetron, acetaminophen **OR** acetaminophen, morphine **OR** morphine  Allergies: No Known Allergies    ROS:  Constitutional: negative for chills, fevers and sweats  Eyes: negative for cataracts, icterus and redness  Ears, nose, mouth, throat, and face: negative for epistaxis, hearing loss and sore throat  Respiratory: negative for cough, hemoptysis and sputum  Cardiovascular: negative for chest pain, dyspnea and lower extremity edema  Gastrointestinal: as per HPI  Genitourinary:negative for dysuria, frequency and hematuria  Neurological: negative for coordination problems, dizziness and gait problems  Behavioral/Psych: negative for anxiety, depression and mood swings    Physical Exam   BP (!) 108/53   Pulse 62   Temp 98.1 °F (36.7 °C) (Oral)   Resp 16   Ht 5' 6\" (1.676 m)   Wt 128 lb (58.1 kg)   SpO2 94%   BMI 20.66 kg/m²       General appearance: alert, appears stated age, cooperative, and no distress  Head: Normocephalic, without obvious abnormality, atraumatic  Eyes: conjunctivae/corneas clear. PERRL, EOM's intact. Fundi benign. Neck: supple, symmetrical, trachea midline  Lungs: clear to auscultation bilaterally  Heart: regular rate and rhythm, S1, S2 normal, no murmur, click, rub or gallop  Abdomen: normal findings: bowel sounds normal, no masses palpable, and symmetric and abnormal findings:  tenderness mild in the lower abdomen  Extremities: extremities normal, atraumatic, no cyanosis or edema    Lab and Imaging Review   Labs:  CBC:   Recent Labs     10/12/22  1101 10/12/22  2027 10/13/22  0224 10/13/22  0844   WBC 31.2*  --   --   --    HGB 17.1* 14.1 13.1 13.3   HCT 51.9* 43.2 40.5 40.9   MCV 94.2  --   --   --      --   --   --      BMP:   Recent Labs     10/12/22  1101 10/13/22  0844    139   K 4.4 4.4    107   CO2 27 25   BUN 18 10   CREATININE 1.0 0.7     LIVER PROFILE:   Recent Labs     10/12/22  1101   AST 30   ALT 35   PROT 6.7   BILITOT 0.9   ALKPHOS 181*     PT/INR:   Recent Labs     10/13/22  0844   INR 1.11       IMAGING:  CTA ABDOMEN PELVIS W WO CONTRAST   Final Result   1. Acute colitis involving the distal transverse through descending colon   favoring an infectious rather than inflammatory etiology. 2. Small amount of complex pelvic ascites. 3. 3.6 cm infrarenal abdominal aortic aneurysm. Recommend CTA of the abdomen   pelvis in 2 years.       RECOMMENDATION:   For management of fusiform AAA:      3.5-3.9 cm AAA, recommend follow-up every 2 years. Attending Supervising [de-identified] Attestation Statement  The patient is a 64 y.o. female. I have performed a history and physical examination of the patient. I discussed the case with my physician assistant Sherrill Burris PA-C    I reviewed the patient's Past Medical History, Past Surgical History, Medications, and Allergies. Physical Exam:  Vitals:    10/13/22 0315 10/13/22 0702 10/13/22 1144 10/13/22 1517   BP: 105/65 (!) 108/53  (!) 115/52   Pulse: 62 62  62   Resp: 14 16 16 18   Temp: 97.9 °F (36.6 °C) 98.1 °F (36.7 °C)  98 °F (36.7 °C)   TempSrc: Oral Oral  Oral   SpO2: 94% 94%  94%   Weight:       Height:           Physical Examination: General appearance - in mild to moderate distress  Mental status - alert, oriented to person, place, and time  Eyes - sclera anicteric  Neck - supple, no significant adenopathy  Chest - not examined  Heart - normal rate and regular rhythm  Abdomen - tenderness noted lower abd  Extremities - no pedal edema noted       Assessment:     64year old female with a history of HLD, GERD, chronic back pain, carotid artery occlusion, CVA, cerebral aneurysm, vocal cord polyps, s/p cholecystectomy, and infrarenal AAA admitted with acute colitis, likely infectious vs ischemic. Plan:   Continue supportive care  Monitor Hgb   Observe for signs of bleeding  Monitor and document output  Continue broad spectrum antibiotics  Continue Pantoprazole 40 mg qAM  Bowel regimen with probiotics daily  Check stool tests  Advance to full liquid, low fiber diet as tolerated  Will follow  Recommend colonoscopy in 4 weeks upon d/c      Sravanthi Hernadez PA-C  10:48 AM 10/13/2022                      72-year-old female with history of hypertension, hyperlipidemia, chronic back pain, carotid artery occlusion, CVA, abdominal aortic aneurysm and GERD admitted with severe acute colitis.  Differential includes ischemic colitis, Infectious colitis and inflammatory bowel disease. Continue supportive care. Check stool tests. Continue Broad-spectrum antibiotics. Advance to low fiber diet as tolerated. Ambulate 3 times a day. Outpatient colonoscopy in 4 weeks.     Dorinda Delgado MD          [phone]  [phone]

## 2022-10-13 NOTE — PROGRESS NOTES
Admit: 10/12/2022    Name:  Noel Garnett  Room:  /2562-82  MRN:    [id number]    Daily Progress Note for 10/13/2022     Admitted with abdominal pain and bloody stools  CT shows colitis    Interval History:     Continues to have bright red blood per rectum    Scheduled Meds:   sodium chloride flush  5-40 mL IntraVENous 2 times per day    pantoprazole  40 mg IntraVENous BID    gabapentin  1,200 mg Oral Nightly    atorvastatin  40 mg Oral Daily    nicotine  1 patch TransDERmal Daily       Continuous Infusions:   sodium chloride      sodium chloride 150 mL/hr at 10/13/22 0638       PRN Meds:  sodium chloride flush, sodium chloride, ondansetron **OR** ondansetron, acetaminophen **OR** acetaminophen, morphine **OR** morphine         Objective:     Temp  Av.4 °F (36.9 °C)  Min: 97.9 °F (36.6 °C)  Max: 98.7 °F (37.1 °C)  Pulse  Av.6  Min: 62  Max: 98  BP  Min: 88/49  Max: 109/61  SpO2  Av %  Min: 93 %  Max: 100 %  Patient Vitals for the past 4 hrs:   BP Temp Temp src Pulse Resp SpO2   10/13/22 0702 (!) 108/53 98.1 °F (36.7 °C) Oral 62 16 94 %         Intake/Output Summary (Last 24 hours) at 10/13/2022 0805  Last data filed at 10/13/2022 5762  Gross per 24 hour   Intake 1099.6 ml   Output 700 ml   Net 399.6 ml       Physical Exam:  Gen: No distress. Alert. Eyes: PERRL. No sclera icterus. No conjunctival injection. ENT: No discharge. Pharynx clear. Neck: Trachea midline. Resp: No accessory muscle use. No crackles. No wheezes. No rhonchi. CV: Regular rate. Regular rhythm. No murmur. No rub. No edema. GI: + Tender. Non-distended. Normal bowel sounds. No hernia. Skin: Warm and dry. No nodule on exposed extremities. No rash on exposed extremities. M/S: No cyanosis. No joint deformity. No clubbing. Neuro: Awake. Grossly nonfocal    Psych: Oriented x 3. No anxiety or agitation.      Lab Data:  CBC:   Recent Labs     10/12/22  1101 10/12/22  2027 10/13/22  0224   WBC 31.2*  --   --    RBC 5.52*  --   --    HGB 17.1* 14.1 13.1   HCT 51.9* 43.2 40.5   MCV 94.2  --   --    RDW 14.3  --   --      --   --      BMP:   Recent Labs     10/12/22  1101      K 4.4      CO2 27   BUN 18   CREATININE 1.0     BNP: No results for input(s): BNP in the last 72 hours. PT/INR: No results for input(s): PROTIME, INR in the last 72 hours. APTT:No results for input(s): APTT in the last 72 hours. CARDIAC ENZYMES: No results for input(s): CKMB, CKMBINDEX, TROPONINI in the last 72 hours. Invalid input(s): CKTOTAL;3  FASTING LIPID PANEL:  Lab Results   Component Value Date/Time    CHOL 136 12/03/2017 04:15 AM    HDL 30 12/03/2017 04:15 AM    TRIG 107 12/03/2017 04:15 AM     LIVER PROFILE:   Recent Labs     10/12/22  1101   AST 30   ALT 35   BILITOT 0.9   ALKPHOS 181*         CTA ABDOMEN PELVIS W WO CONTRAST   Final Result   1. Acute colitis involving the distal transverse through descending colon   favoring an infectious rather than inflammatory etiology. 2. Small amount of complex pelvic ascites. 3. 3.6 cm infrarenal abdominal aortic aneurysm. Recommend CTA of the abdomen   pelvis in 2 years. RECOMMENDATION:   For management of fusiform AAA:      3.5-3.9 cm AAA, recommend follow-up every 2 years. Assessment & Plan:     Patient Active Problem List    Diagnosis Date Noted    BRBPR (bright red blood per rectum) 10/12/2022    Colitis 10/12/2022    Leukocytosis 10/12/2022    Generalized abdominal pain 10/12/2022    Palpitations 04/29/2021    Dyspnea on exertion 04/29/2021    TIA (transient ischemic attack) 12/13/2017    Carotid occlusion, left     Smoking     Expressive aphasia     Transient cerebral ischemia     ICAO (internal carotid artery occlusion), left 12/02/2017    Thrombocytosis 12/02/2017    Arthritis of right knee 11/21/2017    Status post total right knee replacement 11/21/2017       GI Bleed  Acute infectious colitis  -admitted to PCU on tele.  GI consulted  -IV Cipro and Flagyl  -IVF's  -pain control: morphine prn  -monitor H/H. IV PPI BID     3.6 cm infrarenal abdominal aortic aneurysm  -needs Repeat CTA abdomen/pelvis in 2 years  -patient aware. GERD  -IV PPI daily     Chronic back pain     H/o carotid artery occlusion  H/o CVA  -on aspirin, Atorvastatin  -holding aspirin due to bleeding. Cerebral aneurysm  -F/w Dr. Husam Jc     Hyperlipidemia  -on Atorvastatin     Vocal cord polyps  -had excision of lesion on 10/3     DVT Prophylaxis: SCDs  Diet: ADULT DIET;  Clear Liquid  Code Status: Prior    Asaf Jacobson MD

## 2022-10-14 VITALS
HEIGHT: 66 IN | SYSTOLIC BLOOD PRESSURE: 115 MMHG | BODY MASS INDEX: 21.6 KG/M2 | TEMPERATURE: 98.1 F | RESPIRATION RATE: 18 BRPM | OXYGEN SATURATION: 98 % | DIASTOLIC BLOOD PRESSURE: 52 MMHG | HEART RATE: 58 BPM | WEIGHT: 134.38 LBS

## 2022-10-14 LAB
ANION GAP SERPL CALCULATED.3IONS-SCNC: 6 MMOL/L (ref 3–16)
BUN BLDV-MCNC: 7 MG/DL (ref 7–20)
CALCIUM SERPL-MCNC: 8.2 MG/DL (ref 8.3–10.6)
CHLORIDE BLD-SCNC: 109 MMOL/L (ref 99–110)
CO2: 24 MMOL/L (ref 21–32)
CREAT SERPL-MCNC: 0.7 MG/DL (ref 0.6–1.1)
GFR AFRICAN AMERICAN: >60
GFR NON-AFRICAN AMERICAN: >60
GLUCOSE BLD-MCNC: 102 MG/DL (ref 70–99)
HCT VFR BLD CALC: 37.6 % (ref 36–48)
HCT VFR BLD CALC: 39.1 % (ref 36–48)
HCT VFR BLD CALC: 40.7 % (ref 36–48)
HEMOGLOBIN: 12.3 G/DL (ref 12–16)
HEMOGLOBIN: 13.4 G/DL (ref 12–16)
HEMOGLOBIN: 13.5 G/DL (ref 12–16)
MCH RBC QN AUTO: 31.4 PG (ref 26–34)
MCHC RBC AUTO-ENTMCNC: 32.8 G/DL (ref 31–36)
MCV RBC AUTO: 95.7 FL (ref 80–100)
PDW BLD-RTO: 14.2 % (ref 12.4–15.4)
PLATELET # BLD: 202 K/UL (ref 135–450)
PMV BLD AUTO: 8.7 FL (ref 5–10.5)
POTASSIUM REFLEX MAGNESIUM: 3.9 MMOL/L (ref 3.5–5.1)
RBC # BLD: 3.93 M/UL (ref 4–5.2)
SODIUM BLD-SCNC: 139 MMOL/L (ref 136–145)
WBC # BLD: 9.7 K/UL (ref 4–11)

## 2022-10-14 PROCEDURE — 85018 HEMOGLOBIN: CPT

## 2022-10-14 PROCEDURE — 85027 COMPLETE CBC AUTOMATED: CPT

## 2022-10-14 PROCEDURE — 2580000003 HC RX 258

## 2022-10-14 PROCEDURE — 2500000003 HC RX 250 WO HCPCS: Performed by: INTERNAL MEDICINE

## 2022-10-14 PROCEDURE — 99239 HOSP IP/OBS DSCHRG MGMT >30: CPT | Performed by: INTERNAL MEDICINE

## 2022-10-14 PROCEDURE — 6370000000 HC RX 637 (ALT 250 FOR IP): Performed by: NURSE PRACTITIONER

## 2022-10-14 PROCEDURE — 6370000000 HC RX 637 (ALT 250 FOR IP): Performed by: PHYSICIAN ASSISTANT

## 2022-10-14 PROCEDURE — 80048 BASIC METABOLIC PNL TOTAL CA: CPT

## 2022-10-14 PROCEDURE — C9113 INJ PANTOPRAZOLE SODIUM, VIA: HCPCS | Performed by: INTERNAL MEDICINE

## 2022-10-14 PROCEDURE — 85014 HEMATOCRIT: CPT

## 2022-10-14 PROCEDURE — 6360000002 HC RX W HCPCS: Performed by: INTERNAL MEDICINE

## 2022-10-14 PROCEDURE — 36415 COLL VENOUS BLD VENIPUNCTURE: CPT

## 2022-10-14 RX ORDER — CIPROFLOXACIN 500 MG/1
500 TABLET, FILM COATED ORAL 2 TIMES DAILY
Qty: 14 TABLET | Refills: 0 | Status: SHIPPED | OUTPATIENT
Start: 2022-10-14 | End: 2022-10-21

## 2022-10-14 RX ORDER — SACCHAROMYCES BOULARDII 250 MG
250 CAPSULE ORAL 2 TIMES DAILY
Qty: 14 CAPSULE | Refills: 0 | Status: SHIPPED | OUTPATIENT
Start: 2022-10-14 | End: 2022-10-21

## 2022-10-14 RX ORDER — METRONIDAZOLE 500 MG/1
500 TABLET ORAL 3 TIMES DAILY
Qty: 21 TABLET | Refills: 0 | Status: SHIPPED | OUTPATIENT
Start: 2022-10-14 | End: 2022-10-21

## 2022-10-14 RX ADMIN — ATORVASTATIN CALCIUM 40 MG: 40 TABLET, FILM COATED ORAL at 09:44

## 2022-10-14 RX ADMIN — CIPROFLOXACIN 400 MG: 2 INJECTION, SOLUTION INTRAVENOUS at 09:56

## 2022-10-14 RX ADMIN — METRONIDAZOLE 500 MG: 500 INJECTION, SOLUTION INTRAVENOUS at 01:06

## 2022-10-14 RX ADMIN — RDII 250 MG CAPSULE 250 MG: at 09:44

## 2022-10-14 RX ADMIN — METRONIDAZOLE 500 MG: 500 INJECTION, SOLUTION INTRAVENOUS at 09:47

## 2022-10-14 RX ADMIN — Medication 10 ML: at 09:51

## 2022-10-14 RX ADMIN — PANTOPRAZOLE SODIUM 40 MG: 40 INJECTION, POWDER, FOR SOLUTION INTRAVENOUS at 05:06

## 2022-10-14 RX ADMIN — SODIUM CHLORIDE: 9 INJECTION, SOLUTION INTRAVENOUS at 09:54

## 2022-10-14 ASSESSMENT — PAIN SCALES - GENERAL
PAINLEVEL_OUTOF10: 0
PAINLEVEL_OUTOF10: 0

## 2022-10-14 NOTE — PROGRESS NOTES
Patient assessment as noted per flowsheet. Denying complaints of pain or discomfort. Call light in patient's reach.

## 2022-10-14 NOTE — PROGRESS NOTES
PROGRESS NOTE  S:56 yrs Patient  admitted on 10/12/2022 with Bright red blood per rectum [K62.5] . Today she feels better. Tolerating diet. No further bleeding. Exam:   Vitals:    10/14/22 1005   BP:    Pulse:    Resp:    Temp: 98.1 °F (36.7 °C)   SpO2:       General appearance: alert, appears stated age, and cooperative  HEENT: Sclera clear, anicteric  Neck: supple, symmetrical, trachea midline  Lungs:  not examined  Heart: regular rate and rhythm  Abdomen: soft, non-tender; bowel sounds normal; no masses,  no organomegaly  Extremities: extremities normal, atraumatic, no cyanosis or edema     Medications: Reviewed    Labs:  CBC:   Recent Labs     10/12/22  1101 10/12/22  2027 10/14/22  0210 10/14/22  0857 10/14/22  1410   WBC 31.2*  --  9.7  --   --    HGB 17.1*   < > 12.3 13.4 13.5   HCT 51.9*   < > 37.6 39.1 40.7   MCV 94.2  --  95.7  --   --      --  202  --   --     < > = values in this interval not displayed. BMP:   Recent Labs     10/12/22  1101 10/13/22  0844 10/14/22  0210    139 139   K 4.4 4.4 3.9    107 109   CO2 27 25 24   BUN 18 10 7   CREATININE 1.0 0.7 0.7     LIVER PROFILE:   Recent Labs     10/12/22  1101   AST 30   ALT 35   PROT 6.7   BILITOT 0.9   ALKPHOS 181*     PT/INR:   Recent Labs     10/13/22  0844   INR 1.11     Impression:64year-old female with history of hypertension, hyperlipidemia, chronic back pain, carotid artery occlusion, CVA, abdominal aortic aneurysm and GERD admitted with severe acute colitis. Differential includes ischemic colitis, Infectious colitis and inflammatory bowel disease.      Recommendation:  Continue supportive care  Continue broad-spectrum antibodies for 2 weeks  Continue probiotics  Monitor hemoglobin  Observe for signs of bleeding  Outpatient colonoscopy in 4 weeks      Don Ford MD, MD  4:37 PM 10/14/2022

## 2022-10-14 NOTE — PROGRESS NOTES
Admit: 10/12/2022    Name:  Kameron Wright  Room:  /7755-26  MRN:    [id number]    Daily Progress Note for 10/14/2022     Admitted with abdominal pain and bloody stools  CT shows colitis    Interval History:     Denies any abdominal pain. No rectal bleeding. Stools are semisolid. Scheduled Meds:   ciprofloxacin  400 mg IntraVENous Q12H    metroNIDAZOLE  500 mg IntraVENous Q8H    pantoprazole  40 mg IntraVENous QAM AC    saccharomyces boulardii  250 mg Oral BID    sodium chloride flush  5-40 mL IntraVENous 2 times per day    gabapentin  1,200 mg Oral Nightly    atorvastatin  40 mg Oral Daily    nicotine  1 patch TransDERmal Daily       Continuous Infusions:   sodium chloride Stopped (10/13/22 1256)    sodium chloride 150 mL/hr at 10/13/22 2026       PRN Meds:  witch hazel-glycerin, sodium chloride flush, sodium chloride, ondansetron **OR** ondansetron, acetaminophen **OR** acetaminophen, morphine **OR** morphine         Objective:     Temp  Av.4 °F (36.9 °C)  Min: 97.9 °F (36.6 °C)  Max: 99.4 °F (37.4 °C)  Pulse  Av  Min: 56  Max: 62  BP  Min: 100/63  Max: 122/62  SpO2  Av.5 %  Min: 94 %  Max: 98 %  Patient Vitals for the past 4 hrs:   BP Temp Temp src Pulse Resp SpO2   10/14/22 0720 (!) 115/52 99.4 °F (37.4 °C) Oral 58 18 98 %           Intake/Output Summary (Last 24 hours) at 10/14/2022 0753  Last data filed at 10/14/2022 0019  Gross per 24 hour   Intake 240 ml   Output 1600 ml   Net -1360 ml         Physical Exam:  Gen: No distress. Alert. Eyes: PERRL. No sclera icterus. No conjunctival injection. ENT: No discharge. Pharynx clear. Neck: Trachea midline. Resp: No accessory muscle use. No crackles. No wheezes. No rhonchi. CV: Regular rate. Regular rhythm. No murmur. No rub. No edema. GI: non Tender. Non-distended. Normal bowel sounds. No hernia. Skin: Warm and dry. No nodule on exposed extremities. No rash on exposed extremities. M/S: No cyanosis. No joint deformity.  No clubbing. Neuro: Awake. Grossly nonfocal    Psych: Oriented x 3. No anxiety or agitation. Lab Data:  CBC:   Recent Labs     10/12/22  1101 10/12/22  2027 10/13/22  1425 10/13/22  1955 10/14/22  0210   WBC 31.2*  --   --   --  9.7   RBC 5.52*  --   --   --  3.93*   HGB 17.1*   < > 12.6 12.9 12.3   HCT 51.9*   < > 37.6 39.3 37.6   MCV 94.2  --   --   --  95.7   RDW 14.3  --   --   --  14.2     --   --   --  202    < > = values in this interval not displayed. BMP:   Recent Labs     10/12/22  1101 10/13/22  0844 10/14/22  0210    139 139   K 4.4 4.4 3.9    107 109   CO2 27 25 24   BUN 18 10 7   CREATININE 1.0 0.7 0.7       BNP: No results for input(s): BNP in the last 72 hours. PT/INR:   Recent Labs     10/13/22  0844   PROTIME 14.1   INR 1.11     APTT:  Recent Labs     10/13/22  0844   APTT 29.8     CARDIAC ENZYMES: No results for input(s): CKMB, CKMBINDEX, TROPONINI in the last 72 hours. Invalid input(s): CKTOTAL;3  FASTING LIPID PANEL:  Lab Results   Component Value Date/Time    CHOL 136 12/03/2017 04:15 AM    HDL 30 12/03/2017 04:15 AM    TRIG 107 12/03/2017 04:15 AM     LIVER PROFILE:   Recent Labs     10/12/22  1101   AST 30   ALT 35   BILITOT 0.9   ALKPHOS 181*           CTA ABDOMEN PELVIS W WO CONTRAST   Final Result   1. Acute colitis involving the distal transverse through descending colon   favoring an infectious rather than inflammatory etiology. 2. Small amount of complex pelvic ascites. 3. 3.6 cm infrarenal abdominal aortic aneurysm. Recommend CTA of the abdomen   pelvis in 2 years. RECOMMENDATION:   For management of fusiform AAA:      3.5-3.9 cm AAA, recommend follow-up every 2 years.                Assessment & Plan:     Patient Active Problem List    Diagnosis Date Noted    BRBPR (bright red blood per rectum) 10/12/2022    Colitis 10/12/2022    Leukocytosis 10/12/2022    Generalized abdominal pain 10/12/2022    Palpitations 04/29/2021    Dyspnea on exertion 04/29/2021    TIA (transient ischemic attack) 12/13/2017    Carotid occlusion, left     Smoking     Expressive aphasia     Transient cerebral ischemia     ICAO (internal carotid artery occlusion), left 12/02/2017    Thrombocytosis 12/02/2017    Arthritis of right knee 11/21/2017    Status post total right knee replacement 11/21/2017       GI Bleed  Acute infectious colitis  -admitted to PCU on tele. GI consulted  -IV Cipro and Flagyl  -IVF's  -pain control: morphine prn  -monitor H/H. IV PPI BID  No rectal bleeding with past 24 hours. No abdominal pain. Advance diet to regular low fiber diet  Outpatient colonoscopy in 4 weeks     3.6 cm infrarenal abdominal aortic aneurysm  -needs Repeat CTA abdomen/pelvis in 2 years  -patient aware. GERD  -IV PPI daily     Chronic back pain     H/o carotid artery occlusion  H/o CVA  -on aspirin, Atorvastatin  -holding aspirin due to bleeding.      Cerebral aneurysm  -F/w Dr. Amy Alcala     Hyperlipidemia  -on Atorvastatin     Vocal cord polyps  -had excision of lesion on 10/3     DVT Prophylaxis: SCDs  Diet: ADULT DIET; full liquid  Code Status: Prior    Dc home later today    Osiel Edwards MD

## 2022-10-14 NOTE — DISCHARGE SUMMARY
Name:  Dano Guy  Room:  /4628-58  MRN:    [id number]    Discharge Summary      This discharge summary is in conjunction with a complete physical exam done on the day of discharge. Discharging Physician: Dr. Deyanira Mark MD     Admit: 10/12/2022  Discharge:  10/14/2022     HPI taken from admission H&P:    The patient is a 64 y.o. female with GERD, arthritis, and chronic back pain who presents to [facility] with c/o abdominal pain. She states onset was last night. The pain was severe and felt like she got beat up. Located from umbilicus to lower abdomen. Associated with diarrhea and having bright red blood. She states the pain improved when this happened. She ended up slipping and falling in the blood. She then was dizzy and light-headed. BP hypotensive. Labs with leukocytosis. Imaging with acute colitis of the distal transverse through descending colon, 3.6 cm infrarenal abdominal aortic aneurysm. Admitted to PCU on tele. GI consulted. Diagnoses this Admission and Hospital Course   GI Bleed  Acute infectious colitis  -admitted to PCU on tele. GI consulted  -IV Cipro and Flagyl  -IVF's  -pain control: morphine prn  -monitor H/H. IV PPI BID  No rectal bleeding with past 24 hours. No abdominal pain. Advance diet to regular low fiber diet  Outpatient colonoscopy in 4 weeks     3.6 cm infrarenal abdominal aortic aneurysm  -needs Repeat CTA abdomen/pelvis in 2 years  -patient aware. GERD  -IV PPI daily     Chronic back pain     H/o carotid artery occlusion  H/o CVA  -on aspirin, Atorvastatin  -holding aspirin due to bleeding.      Cerebral aneurysm  -F/w Dr. Jeri Lay     Hyperlipidemia  -on Atorvastatin     Vocal cord polyps  -had excision of lesion on 10/3    Procedures (Please Review Full Report for Details)  none    Consults    GI      Physical Exam at Discharge:    BP (!) 115/52   Pulse 58   Temp 98.1 °F (36.7 °C)   Resp 18   Ht 5' 6\" (1.676 m)   Wt 134 lb 6 oz (61 kg)   SpO2 98%   BMI 21.69 kg/m²   Gen: No distress. Alert. Eyes: PERRL. No sclera icterus. No conjunctival injection. ENT: No discharge. Pharynx clear. Neck: Trachea midline. Resp: No accessory muscle use. No crackles. No wheezes. No rhonchi. CV: Regular rate. Regular rhythm. No murmur. No rub. No edema. GI: non Tender. Non-distended. Normal bowel sounds. No hernia. Skin: Warm and dry. No nodule on exposed extremities. No rash on exposed extremities. M/S: No cyanosis. No joint deformity. No clubbing. Neuro: Awake. Grossly nonfocal    Psych: Oriented x 3. No anxiety or agitation. CBC:   Recent Labs     10/12/22  1101 10/12/22  2027 10/13/22  1955 10/14/22  0210 10/14/22  0857   WBC 31.2*  --   --  9.7  --    HGB 17.1*   < > 12.9 12.3 13.4   HCT 51.9*   < > 39.3 37.6 39.1   MCV 94.2  --   --  95.7  --      --   --  202  --     < > = values in this interval not displayed. BMP:   Recent Labs     10/12/22  1101 10/13/22  0844 10/14/22  0210    139 139   K 4.4 4.4 3.9    107 109   CO2 27 25 24   BUN 18 10 7   CREATININE 1.0 0.7 0.7     LIVER PROFILE:   Recent Labs     10/12/22  1101   AST 30   ALT 35   BILITOT 0.9   ALKPHOS 181*     PT/INR:   Recent Labs     10/13/22  0844   PROTIME 14.1   INR 1.11     APTT:   Recent Labs     10/13/22  0844   APTT 29.8     UA:  Recent Labs     10/12/22  2048   COLORU Yellow   PHUR 6.0   WBCUA 0-2   RBCUA 0-2   BACTERIA 2+*   CLARITYU Clear   SPECGRAV 1.020   LEUKOCYTESUR Negative   UROBILINOGEN 1.0   BILIRUBINUR SMALL*   BLOODU TRACE-INTACT*   GLUCOSEU Negative           CULTURES  Covid not detected  Blood x2 NGTD    RADIOLOGY  CTA ABDOMEN PELVIS W WO CONTRAST   Final Result   1. Acute colitis involving the distal transverse through descending colon   favoring an infectious rather than inflammatory etiology. 2. Small amount of complex pelvic ascites. 3. 3.6 cm infrarenal abdominal aortic aneurysm.   Recommend CTA of the abdomen pelvis in 2 years. RECOMMENDATION:   For management of fusiform AAA:      3.5-3.9 cm AAA, recommend follow-up every 2 years. Discharge Medications     Medication List        START taking these medications      ciprofloxacin 500 MG tablet  Commonly known as: CIPRO  Take 1 tablet by mouth 2 times daily for 7 days     metroNIDAZOLE 500 MG tablet  Commonly known as: FLAGYL  Take 1 tablet by mouth 3 times daily for 7 days     saccharomyces boulardii 250 MG capsule  Commonly known as: FLORASTOR  Take 1 capsule by mouth 2 times daily for 7 days            CONTINUE taking these medications      amphetamine-dextroamphetamine 10 MG tablet  Commonly known as: ADDERALL     atorvastatin 40 MG tablet  Commonly known as: LIPITOR  TAKE 1 TABLET BY MOUTH EVERY DAY     cyclobenzaprine 10 MG tablet  Commonly known as: FLEXERIL     estrogens (conjugated)-methylTESTOSTERone 1.25-2.5 MG per tablet  Commonly known as: ESTRATEST     gabapentin 300 MG capsule  Commonly known as: NEURONTIN     pantoprazole 40 MG tablet  Commonly known as: PROTONIX     promethazine 25 MG tablet  Commonly known as: PHENERGAN            STOP taking these medications      aspirin 325 MG EC tablet               Where to Get Your Medications        These medications were sent to [id number] [place] [address], [address] [address]      Phone: [phone]   ciprofloxacin 500 MG tablet  metroNIDAZOLE 500 MG tablet  saccharomyces boulardii 250 MG capsule           Discharged in stable condition to home    Follow Up: Follow up with PCP in 1 week    Total time spent on discharge is 35 minutes      MARGARITA Brito.

## 2022-10-14 NOTE — PROGRESS NOTES
PM assessment completed. Scheduled medications given per MAR. VSS room air, A/O x4 denies any needs at this time.  Call light in reach, will monitor,

## 2022-10-14 NOTE — CARE COORDINATION
DISCHARGE ORDER  Date/Time 10/14/2022 9:25 AM  Completed by: Karen Curtis RN, Case Management    Patient Name: Adrian Mata      : 1965  Admitting Diagnosis: Bright red blood per rectum [K62.5]      Admit order Date and Status:inpt  (verify MD's last order for status of admission)      Noted discharge order. If applicable PT/OT recommendation at Discharge: na  DME recommendation by PT/OT:na  Confirmed discharge plan  (yes)    Discharge Plan: Reviewed chart. Met with the pt at bedside. Pt declines home care at this time. Plan continues home with spouse and son, who is an RN per pt, lives nearby and supportive. No other d/c needs voiced or identified. Date of Last IMM Given: 10/13/22    Reviewed chart. Role of discharge planner explained and patient verbalized understanding. Discharge order is noted. Has Home O2 in place on admit:  No  Informed of need to bring portable home O2 tank on day of discharge for nursing to connect prior to leaving:   Not Indicated  Verbalized agreement/Understanding:   Not Indicated  Pt is being d/c'd to home today. Pt's O2 sats are 98% on RA. Discharge timeout done with VERÓNICA Oconnor. All discharge needs and concerns addressed.

## 2022-10-14 NOTE — PROGRESS NOTES
Physician Progress Note      Kane Byrne  CSN #:                  [id number]  :                       1965  ADMIT DATE:       10/12/2022 10:23 AM  DISCH DATE:        10/14/2022 3:40 PM  RESPONDING  PROVIDER #:        Ashu Espinoza MD          QUERY TEXT:    Pt admitted with GIB and has acute infectious colitis documented. Patient   being treated with antibiotics. If possible, please document the type of   colitis in the medical record. The medical record reflects the following:  Risk Factors: GIB, gerd, hld  Clinical Indicators: Acute infectious colitis  Treatment: GI consult, Cipro/flagyl, IVF    Thank you for your assistance,  Carmen Alvarez RN,BSN,CCDS,CRCR  Options provided:  -- Bacterial Colitis  -- Colitis due to other etiology, Please document other etiology. -- Other - I will add my own diagnosis  -- Disagree - Not applicable / Not valid  -- Disagree - Clinically unable to determine / Unknown  -- Refer to Clinical Documentation Reviewer    PROVIDER RESPONSE TEXT:    This patient has bacterial colitis.     Query created by: Niya Lam on 10/14/2022 2:10 PM      Electronically signed by:  Ashu Espinoza MD 10/14/2022 4:19 PM

## 2022-10-14 NOTE — PLAN OF CARE
Problem: Discharge Planning  Goal: Discharge to home or other facility with appropriate resources  Outcome: Completed     Problem: Pain  Goal: Verbalizes/displays adequate comfort level or baseline comfort level  Patient denying complaints of abdominal pain or nausea, abdomen only slightly tender upon palpation.   Outcome: Completed     Problem: ABCDS Injury Assessment  Goal: Absence of physical injury  Outcome: Completed     Problem: Chronic Conditions and Co-morbidities  Goal: Patient's chronic conditions and co-morbidity symptoms are monitored and maintained or improved  Outcome: Completed

## 2022-10-14 NOTE — PLAN OF CARE
Problem: Discharge Planning  Goal: Discharge to home or other facility with appropriate resources  10/13/2022 2331 by Estela Urbina RN  Outcome: Progressing  10/13/2022 1640 by Adri Gar RN  Outcome: Progressing     Problem: Pain  Goal: Verbalizes/displays adequate comfort level or baseline comfort level  10/13/2022 2331 by Estela Urbina RN  Outcome: Progressing  10/13/2022 1640 by Adri Gar RN  Outcome: Progressing     Problem: ABCDS Injury Assessment  Goal: Absence of physical injury  10/13/2022 2331 by Estela Urbina RN  Outcome: Progressing  10/13/2022 1640 by Adri Gar RN  Outcome: Progressing     Problem: Chronic Conditions and Co-morbidities  Goal: Patient's chronic conditions and co-morbidity symptoms are monitored and maintained or improved  10/13/2022 2331 by Estela Urbina RN  Outcome: Progressing  10/13/2022 1640 by Adri Gar RN  Outcome: Progressing

## 2022-10-14 NOTE — DISCHARGE INSTR - DIET
Good nutrition is important when healing from an illness, injury, or surgery. Follow any nutrition recommendations given to you during your hospital stay. If you were given an oral nutrition supplement while in the hospital, continue to take this supplement at home. You can take it with meals, in-between meals, and/or before bedtime. These supplements can be purchased at most local grocery stores, pharmacies, and chain [employer]-stores. If you have any questions about your diet or nutrition, call the hospital and ask for the dietitian.       General, Low Fiber Diet

## 2022-10-16 LAB
BLOOD CULTURE, ROUTINE: NORMAL
CULTURE, BLOOD 2: NORMAL

## 2022-10-17 ENCOUNTER — CARE COORDINATION (OUTPATIENT)
Dept: CASE MANAGEMENT | Age: 57
End: 2022-10-17

## 2022-10-17 NOTE — CARE COORDINATION
[facility] Care Transitions Initial Follow Up Call    Call within 2 business days of discharge: Yes    Patient: Robert Kerr Patient : 1965   MRN: [id number]  Reason for Admission: GIB, acute infectious colitis, 3.6 cm infrarenal abd aortic aneurysm, GERD, chronic back pain, hx carotid artery occlusion, hx CVA, cerebral aneurysm, HLD, vocal cord polyps s/p excision of lesion 10/3 -> home no services, low fiber diet, OP colonoscopy in 4 weeks  Discharge Date: 10/14/22 RARS: Readmission Risk Score: 7.1      Last Discharge [address]       Date Complaint Diagnosis Description Type Department Provider    10/12/22 Abdominal Pain Colitis . .. ED to Hosp-Admission (Discharged) (ADMITTED) [facility] PCU Esvin Coleman MD; Cat Solano. .. Was this an external facility discharge? No Discharge Facility: NA    1st attempt - CTN attempted follow-up outreach to patient. Message left including CTN contact information for return call.      Follow Up  Future Appointments   Date Time Provider Luciana Carreon   1/3/2023  9:30 AM [facility] WC MAMMO 2 [facility] EG FRITZ Johnson, RN  Care Transition Nurse  [phone] mobile

## 2022-10-18 ENCOUNTER — CARE COORDINATION (OUTPATIENT)
Dept: CASE MANAGEMENT | Age: 57
End: 2022-10-18

## 2022-10-18 NOTE — CARE COORDINATION
[facility] Care Transitions Initial Follow Up Call    Call within 2 business days of discharge: Yes    Patient: [place] Patient : 1965   MRN: [id number]  Reason for Admission: GIB, acute infectious colitis, 3.6 cm infrarenal abd aortic aneurysm, GERD, chronic back pain, hx carotid artery occlusion, hx CVA, cerebral aneurysm, HLD, vocal cord polyps s/p excision of lesion 10/3 -> home no services, low fiber diet, OP colonoscopy in 4 weeks  Discharge Date: 10/14/22 RARS: Readmission Risk Score: 7.1      Last Discharge [address]       Date Complaint Diagnosis Description Type Department Provider    10/12/22 Abdominal Pain Colitis . .. ED to Hosp-Admission (Discharged) (ADMITTED) [facility] PCU John Madrigal MD; Cat Solano. .. 2nd attempt - CTN attempted follow-up outreach to patient. Message left including CTN contact information for return call. No further CTN outreach scheduled. Episode resolved.      Follow Up  Future Appointments   Date Time Provider Luciana Carreon   1/3/2023  9:30 AM [facility] MAMMO 2 [facility] FR[facility]Z Stiles RN  Care Transition Nurse  [phone] mobile

## 2022-12-02 ENCOUNTER — HOSPITAL ENCOUNTER (OUTPATIENT)
Dept: CT IMAGING | Age: 57
Discharge: HOME | End: 2022-12-02
Payer: MEDICARE

## 2022-12-02 DIAGNOSIS — I67.1 CEREBRAL ANEURYSM, NONRUPTURED: ICD-10-CM

## 2022-12-02 DIAGNOSIS — I65.22 OCCLUSION OF LEFT CAROTID ARTERY: ICD-10-CM

## 2022-12-02 PROCEDURE — 70496 CT ANGIOGRAPHY HEAD: CPT

## 2022-12-02 PROCEDURE — 6360000004 HC RX CONTRAST MEDICATION: Performed by: NEUROLOGICAL SURGERY

## 2022-12-02 RX ADMIN — IOPAMIDOL 75 ML: 755 INJECTION, SOLUTION INTRAVENOUS at 10:47

## 2023-01-24 ENCOUNTER — HOSPITAL ENCOUNTER (OUTPATIENT)
Age: 58
Discharge: HOME | End: 2023-01-24
Payer: MEDICARE

## 2023-01-24 LAB
A/G RATIO: 1.4 (ref 1.1–2.2)
ALBUMIN SERPL-MCNC: 4.1 G/DL (ref 3.4–5)
ALP BLD-CCNC: 210 U/L (ref 40–129)
ALT SERPL-CCNC: 29 U/L (ref 10–40)
ANION GAP SERPL CALCULATED.3IONS-SCNC: 15 MMOL/L (ref 3–16)
AST SERPL-CCNC: 24 U/L (ref 15–37)
BILIRUB SERPL-MCNC: 0.4 MG/DL (ref 0–1)
BUN BLDV-MCNC: 8 MG/DL (ref 7–20)
CALCIUM SERPL-MCNC: 9.4 MG/DL (ref 8.3–10.6)
CHLORIDE BLD-SCNC: 102 MMOL/L (ref 99–110)
CHOLESTEROL, TOTAL: 152 MG/DL (ref 0–199)
CO2: 27 MMOL/L (ref 21–32)
CREAT SERPL-MCNC: 0.7 MG/DL (ref 0.6–1.1)
GFR SERPL CREATININE-BSD FRML MDRD: >60 ML/MIN/{1.73_M2}
GLUCOSE BLD-MCNC: 115 MG/DL (ref 70–99)
HCT VFR BLD CALC: 46.1 % (ref 36–48)
HDLC SERPL-MCNC: 58 MG/DL (ref 40–60)
HEMOGLOBIN: 15.3 G/DL (ref 12–16)
LDL CHOLESTEROL CALCULATED: 70 MG/DL
MAGNESIUM: 2.2 MG/DL (ref 1.8–2.4)
MCH RBC QN AUTO: 31.6 PG (ref 26–34)
MCHC RBC AUTO-ENTMCNC: 33.2 G/DL (ref 31–36)
MCV RBC AUTO: 95.2 FL (ref 80–100)
PDW BLD-RTO: 13.4 % (ref 12.4–15.4)
PLATELET # BLD: 290 K/UL (ref 135–450)
PMV BLD AUTO: 8.9 FL (ref 5–10.5)
POTASSIUM SERPL-SCNC: 4.5 MMOL/L (ref 3.5–5.1)
RBC # BLD: 4.84 M/UL (ref 4–5.2)
SODIUM BLD-SCNC: 144 MMOL/L (ref 136–145)
TOTAL PROTEIN: 7.1 G/DL (ref 6.4–8.2)
TRIGL SERPL-MCNC: 119 MG/DL (ref 0–150)
TSH SERPL DL<=0.05 MIU/L-ACNC: 1.79 UIU/ML (ref 0.27–4.2)
VITAMIN B-12: 342 PG/ML (ref 211–911)
VLDLC SERPL CALC-MCNC: 24 MG/DL
WBC # BLD: 6.9 K/UL (ref 4–11)

## 2023-01-24 PROCEDURE — 80061 LIPID PANEL: CPT

## 2023-01-24 PROCEDURE — 83735 ASSAY OF MAGNESIUM: CPT

## 2023-01-24 PROCEDURE — 82607 VITAMIN B-12: CPT

## 2023-01-24 PROCEDURE — 84443 ASSAY THYROID STIM HORMONE: CPT

## 2023-01-24 PROCEDURE — 85027 COMPLETE CBC AUTOMATED: CPT

## 2023-01-24 PROCEDURE — 80053 COMPREHEN METABOLIC PANEL: CPT

## 2023-01-27 ENCOUNTER — ANESTHESIA EVENT (OUTPATIENT)
Dept: ENDOSCOPY | Age: 58
End: 2023-01-27
Payer: MEDICARE

## 2023-01-30 ENCOUNTER — ANESTHESIA (OUTPATIENT)
Dept: ENDOSCOPY | Age: 58
End: 2023-01-30
Payer: MEDICARE

## 2023-01-30 ENCOUNTER — HOSPITAL ENCOUNTER (OUTPATIENT)
Age: 58
Setting detail: OUTPATIENT SURGERY
Discharge: HOME | End: 2023-01-30
Attending: INTERNAL MEDICINE | Admitting: INTERNAL MEDICINE
Payer: MEDICARE

## 2023-01-30 VITALS
HEIGHT: 66 IN | TEMPERATURE: 97.4 F | RESPIRATION RATE: 16 BRPM | HEART RATE: 66 BPM | DIASTOLIC BLOOD PRESSURE: 61 MMHG | WEIGHT: 135 LBS | BODY MASS INDEX: 21.69 KG/M2 | OXYGEN SATURATION: 100 % | SYSTOLIC BLOOD PRESSURE: 112 MMHG

## 2023-01-30 DIAGNOSIS — K55.9 ISCHEMIC COLITIS (HCC): ICD-10-CM

## 2023-01-30 PROCEDURE — 3609010600 HC COLONOSCOPY POLYPECTOMY SNARE/COLD BIOPSY: Performed by: INTERNAL MEDICINE

## 2023-01-30 PROCEDURE — 88305 TISSUE EXAM BY PATHOLOGIST: CPT

## 2023-01-30 PROCEDURE — 2709999900 HC NON-CHARGEABLE SUPPLY: Performed by: INTERNAL MEDICINE

## 2023-01-30 PROCEDURE — 2500000003 HC RX 250 WO HCPCS

## 2023-01-30 PROCEDURE — 3700000001 HC ADD 15 MINUTES (ANESTHESIA): Performed by: INTERNAL MEDICINE

## 2023-01-30 PROCEDURE — 6360000002 HC RX W HCPCS

## 2023-01-30 PROCEDURE — 7100000011 HC PHASE II RECOVERY - ADDTL 15 MIN: Performed by: INTERNAL MEDICINE

## 2023-01-30 PROCEDURE — 3700000000 HC ANESTHESIA ATTENDED CARE: Performed by: INTERNAL MEDICINE

## 2023-01-30 PROCEDURE — 7100000010 HC PHASE II RECOVERY - FIRST 15 MIN: Performed by: INTERNAL MEDICINE

## 2023-01-30 PROCEDURE — 2580000003 HC RX 258: Performed by: ANESTHESIOLOGY

## 2023-01-30 RX ORDER — LIDOCAINE HYDROCHLORIDE 20 MG/ML
INJECTION, SOLUTION EPIDURAL; INFILTRATION; INTRACAUDAL; PERINEURAL PRN
Status: DISCONTINUED | OUTPATIENT
Start: 2023-01-30 | End: 2023-01-30 | Stop reason: SDUPTHER

## 2023-01-30 RX ORDER — FLUOXETINE HYDROCHLORIDE 20 MG/1
20 CAPSULE ORAL DAILY
COMMUNITY

## 2023-01-30 RX ORDER — PROPOFOL 10 MG/ML
INJECTION, EMULSION INTRAVENOUS PRN
Status: DISCONTINUED | OUTPATIENT
Start: 2023-01-30 | End: 2023-01-30 | Stop reason: SDUPTHER

## 2023-01-30 RX ORDER — PROPOFOL 10 MG/ML
INJECTION, EMULSION INTRAVENOUS CONTINUOUS PRN
Status: DISCONTINUED | OUTPATIENT
Start: 2023-01-30 | End: 2023-01-30 | Stop reason: SDUPTHER

## 2023-01-30 RX ORDER — SODIUM CHLORIDE, SODIUM LACTATE, POTASSIUM CHLORIDE, CALCIUM CHLORIDE 600; 310; 30; 20 MG/100ML; MG/100ML; MG/100ML; MG/100ML
INJECTION, SOLUTION INTRAVENOUS CONTINUOUS
Status: DISCONTINUED | OUTPATIENT
Start: 2023-01-30 | End: 2023-01-30 | Stop reason: HOSPADM

## 2023-01-30 RX ADMIN — PROPOFOL 50 MG: 10 INJECTION, EMULSION INTRAVENOUS at 12:47

## 2023-01-30 RX ADMIN — LIDOCAINE HYDROCHLORIDE 100 MG: 20 INJECTION, SOLUTION EPIDURAL; INFILTRATION; INTRACAUDAL; PERINEURAL at 12:43

## 2023-01-30 RX ADMIN — PROPOFOL 150 MCG/KG/MIN: 10 INJECTION, EMULSION INTRAVENOUS at 12:43

## 2023-01-30 RX ADMIN — SODIUM CHLORIDE, POTASSIUM CHLORIDE, SODIUM LACTATE AND CALCIUM CHLORIDE: 600; 310; 30; 20 INJECTION, SOLUTION INTRAVENOUS at 11:57

## 2023-01-30 RX ADMIN — PROPOFOL 50 MG: 10 INJECTION, EMULSION INTRAVENOUS at 12:43

## 2023-01-30 ASSESSMENT — ENCOUNTER SYMPTOMS: SHORTNESS OF BREATH: 1

## 2023-01-30 ASSESSMENT — PAIN - FUNCTIONAL ASSESSMENT: PAIN_FUNCTIONAL_ASSESSMENT: 0-10

## 2023-01-30 NOTE — H&P
Gastroenterology Note             Pre-operative History and Physical    Patient: Robert Flor  : 1965  CSN: [id number]    History Obtained From:  Patient and/or guardian. HISTORY OF PRESENT ILLNESS:    Indication: The patient is a 62 y.o. female  here for colonoscopy. Recent history of colitis, presumed ischemic, now with ongoing constipation and abdominal bloating. Last colonoscopy . Past Medical History:    Past Medical History:   Diagnosis Date    Acid reflux     ADD (attention deficit disorder)     Arthritis     Back pain     chronic    CAD (coronary artery disease)     Cerebral artery occlusion with cerebral infarction (HCC)     Hx of blood clots     Nausea     PONV (postoperative nausea and vomiting)     Spinal cord stimulator status     left hip/back - pt does not use    Wears dentures     Upper     Past Surgical History:    Past Surgical History:   Procedure Laterality Date    BACK SURGERY       lumbar x2    CHOLECYSTECTOMY      HAND SURGERY Bilateral     thumb joint replacement    HYSTERECTOMY (CERVIX STATUS UNKNOWN)      KNEE ARTHROSCOPY Right     x2    KNEE SURGERY Right 2017    RIGHT TOTAL KNEE REPLACEMENT     POLYPECTOMY Bilateral 10/03/2022    vocal cords     Medications Prior to Admission:   No current facility-administered medications on file prior to encounter. Current Outpatient Medications on File Prior to Encounter   Medication Sig Dispense Refill    FLUoxetine (PROZAC) 20 MG capsule Take 20 mg by mouth daily      atorvastatin (LIPITOR) 40 MG tablet TAKE 1 TABLET BY MOUTH EVERY DAY 60 tablet 0    estrogens, conjugated,-methyltestosterone (ESTRATEST) 1.25-2.5 MG per tablet Take 1 tablet by mouth. (Patient not taking: Reported on 2023)      gabapentin (NEURONTIN) 300 MG capsule Take 1,200 mg by mouth nightly.       pantoprazole (PROTONIX) 40 MG tablet Take 40 mg by mouth      cyclobenzaprine (FLEXERIL) 10 MG tablet Take 10 mg by mouth amphetamine-dextroamphetamine (ADDERALL) 10 MG tablet Take 30 mg by mouth daily.       promethazine (PHENERGAN) 25 MG tablet TAKE 1 TABLET BY MOUTH 3 TIMES A DAY AS NEEDED  3        Allergies:  Patient has no known allergies.      Social History:   Social History     Tobacco Use    Smoking status: Some Days     Packs/day: 0.50     Types: Cigarettes    Smokeless tobacco: Never    Tobacco comments:     E-cig   Substance Use Topics    Alcohol use: No     Family History:   Family History   Problem Relation Age of Onset    High Blood Pressure Mother     Heart Disease Maternal Grandfather        PHYSICAL EXAM:      BP (!) 115/43   Pulse 75   Temp 97.5 °F (36.4 °C) (Oral)   Resp 16   Ht 5' 6\" (1.676 m)   Wt 135 lb (61.2 kg)   SpO2 100%   BMI 21.79 kg/m²  I        Heart:   RRR, normal s1s2    Lungs:  CTA bilat,  Normal effort    Abdomen:   NT, ND      ASA Grade:  ASA 2 - Patient with mild systemic disease with no functional limitations    Mallampati Class: 2          ASSESSMENT AND PLAN:    1.  Patient is a 57 y.o. female here for colonoscopy with MAC.   2.  Procedure options, risks, and benefits reviewed with patient who expresses understanding and consent.    Alon Bartholomew MD, MD,   [facility]  1/30/2023

## 2023-01-30 NOTE — PROGRESS NOTES
Ambulatory Surgery/Procedure Discharge Note    Vitals:    01/30/23 1325   BP: 112/61   Pulse: 66   Resp: 16   Temp: 97.4 °F (36.3 °C)   SpO2: 100%     Phase 2  No intake/output data recorded. Restroom use offered before discharge. Yes    Pain assessment:  none  Pain Level: 0/10    Reviewed D/c instructions with son and pt both verbalized their understanding. Dr. Tripp spoke with them both and up-dated   [phone] d/c iv up to dress  [phone]    Patient discharged to home/self care.  Patient discharged via wheel chair by transporter to waiting family/S.O.       1/30/2023

## 2023-01-30 NOTE — ANESTHESIA POSTPROCEDURE EVALUATION
Department of Anesthesiology  Postprocedure Note    Patient: Shannan Sethi  MRN: [id number]  YOB: 1965  Date of evaluation: 1/30/2023      Procedure Summary     Date: 01/30/23 Room / Location: [address] [place] [address]    Anesthesia Start: 9719 Anesthesia Stop: 7828    Procedure: COLONOSCOPY POLYPECTOMY SNARE/COLD BIOPSY Diagnosis:       Ischemic colitis ([address]      (Ischemic colitis ([facility] [address] [K55.9])    Surgeons: Robert Gonzalez MD Responsible Provider: Camille Vance MD    Anesthesia Type: MAC ASA Status: 2          Anesthesia Type: No value filed.     Guido Phase I: Guido Score: 10    Guido Phase II: Guido Score: 10      Anesthesia Post Evaluation    Patient location during evaluation: PACU  Level of consciousness: awake  Complications: no  Multimodal analgesia pain management approach

## 2023-01-30 NOTE — OP NOTE
Colonoscopy Procedure  Note          Patient: [facility]  : 1965  CSN: [id number]    Procedure: Colonoscopy with polypectomy (cold snare)    Date:  2023    Surgeon:  Kristina Antonio MD, MD    Referring Physician:   Karin Butler MD    Preoperative Diagnosis:  Unexplained colitis, chronic constipation    Postoperative Diagnosis:  Colonic polyps     Anesthesia:  MAC per anesthesia record. EBL: <5 mL    Indications: This is a 62y.o. year old female who presents today with diagnosis of colitis involving the splenic flexure watershed region 10/2022. Worsening chronic constipation since onset of symptoms. Last colonoscopy in 2017 was normal.     Procedure: An informed consent was obtained from the patient after explanation of indications, benefits, possible risks and complications of the procedure. The patient was then taken to the endoscopy suite, placed in the left lateral decubitus position, and the above IV anesthesia was administered. The Olympus colonoscope was inserted through the anus into the rectum. The scope was then carefully advanced through the length of the colon to the cecum. The ileocecal valve and appendiceal orifice was visualized. The quality of preparation was good. Water was used to clear remaining stool from the colon to allow for careful examination of the mucosa with insufflation. The scope was carefully withdrawn for more than 6 minutes. Retroflexion was performed in the rectum. The scope was then straightened, the colon was decompressed, and the scope was withdrawn. Findings:   -Perianal and digital rectal exam: Normal   -Rectum: Normal   -Sigmoid: Normal   -Descending: Normal   -Transverse: 3 flat polyps  measuring 3-5mm removed with cold snare and sent for histology. -Ascending: A 5mm flat polyp was removed with cold snare and sent for histology.   -Cecum: Normal   -Exam was somewhat difficult due to the redundant nature of the colon. External pressure was needed to intubate the cecum. The patient tolerated the procedure well and was taken to the PACU in good condition. Complications: No immediate complications. Impression:    -Colonic polyps x4  -No evidence of ongoing colitis, stricture, stenosis, or mass. Recommendations:    -Await pathology results. -Repeat colonoscopy in 5 years, pending polyp histology.   -Ischemic colitis, now resolved, remains the most likely consideration for presentation in 10/2022. Continued control of constipation with Linzess and Miralax recommended.      Jack White MD, MD  [facility]  1/30/2023

## 2023-01-30 NOTE — ANESTHESIA PRE PROCEDURE
Department of Anesthesiology  Preprocedure Note       Name:  Td Xiong   Age:  62 y.o.  :  1965                                          MRN:  [id number]         Date:  2023      Surgeon: Florina Robb):  Ada Patel MD    Procedure: Procedure(s):  COLONOSCOPY    Medications prior to admission:   Prior to Admission medications    Medication Sig Start Date End Date Taking? Authorizing Provider   FLUoxetine (PROZAC) 20 MG capsule Take 20 mg by mouth daily   Yes Historical Provider, MD   atorvastatin (LIPITOR) 40 MG tablet TAKE 1 TABLET BY MOUTH EVERY DAY 22   Radhames Davies MD   estrogens, conjugated,-methyltestosterone (ESTRATEST) 1.25-2.5 MG per tablet Take 1 tablet by mouth. Patient not taking: Reported on 2023    Historical Provider, MD   gabapentin (NEURONTIN) 300 MG capsule Take 1,200 mg by mouth nightly. Historical Provider, MD   pantoprazole (PROTONIX) 40 MG tablet Take 40 mg by mouth    Historical Provider, MD   cyclobenzaprine (FLEXERIL) 10 MG tablet Take 10 mg by mouth    Historical Provider, MD   amphetamine-dextroamphetamine (ADDERALL) 10 MG tablet Take 30 mg by mouth daily.      Historical Provider, MD   promethazine (PHENERGAN) 25 MG tablet TAKE 1 TABLET BY MOUTH 3 TIMES A DAY AS NEEDED 17   Historical Provider, MD       Current medications:    Current Facility-Administered Medications   Medication Dose Route Frequency Provider Last Rate Last Admin    lactated ringers IV soln infusion   IntraVENous Continuous Rebalicia Hennessy DO           Allergies:  No Known Allergies    Problem List:    Patient Active Problem List   Diagnosis Code    Arthritis of right knee M17.11    Status post total right knee replacement Z96.651    ICAO (internal carotid artery occlusion), left I65.22    Thrombocytosis D75.839    Expressive aphasia R47.01    Transient cerebral ischemia G45.9    Smoking F17.200    Carotid occlusion, left I65.22    TIA (transient ischemic attack) G45.9    Palpitations R00.2    Dyspnea on exertion R06.09    BRBPR (bright red blood per rectum) K62.5    Colitis K52.9    Leukocytosis D72.829    Generalized abdominal pain R10.84       Past Medical History:        Diagnosis Date    Acid reflux     ADD (attention deficit disorder)     Arthritis     Back pain     chronic    CAD (coronary artery disease)     Cerebral artery occlusion with cerebral infarction (HCC)     Hx of blood clots     Nausea     PONV (postoperative nausea and vomiting)     Spinal cord stimulator status     left hip/back - pt does not use    Wears dentures     Upper       Past Surgical History:        Procedure Laterality Date    BACK SURGERY  1997     lumbar x2    CHOLECYSTECTOMY      HAND SURGERY Bilateral     thumb joint replacement    HYSTERECTOMY (CERVIX STATUS UNKNOWN)      KNEE ARTHROSCOPY Right     x2    KNEE SURGERY Right 11/21/2017    RIGHT TOTAL KNEE REPLACEMENT     POLYPECTOMY Bilateral 10/03/2022    vocal cords       Social History:    Social History     Tobacco Use    Smoking status: Some Days     Packs/day: 0.50     Types: Cigarettes    Smokeless tobacco: Never    Tobacco comments:     E-cig   Substance Use Topics    Alcohol use:  No                                Ready to quit: Not Answered  Counseling given: Not Answered  Tobacco comments: E-cig      Vital Signs (Current):   Vitals:    01/30/23 1123   BP: (!) 115/43   Pulse: 75   Resp: 16   Temp: 97.5 °F (36.4 °C)   TempSrc: Oral   SpO2: 100%   Weight: 135 lb (61.2 kg)   Height: 5' 6\" (1.676 m)                                              BP Readings from Last 3 Encounters:   01/30/23 (!) 115/43   10/14/22 (!) 115/52   07/29/21 120/70       NPO Status: Time of last liquid consumption: 0600                        Time of last solid consumption: 0800                        Date of last liquid consumption: 01/30/23                        Date of last solid food consumption: 01/28/23    BMI:   Wt Readings from Last 3 Encounters:   01/30/23 135 lb (61.2 kg)   10/14/22 134 lb 6 oz (61 kg)   07/29/21 134 lb (60.8 kg)     Body mass index is 21.79 kg/m². CBC:   Lab Results   Component Value Date/Time    WBC 6.9 01/24/2023 08:36 AM    RBC 4.84 01/24/2023 08:36 AM    HGB 15.3 01/24/2023 08:36 AM    HCT 46.1 01/24/2023 08:36 AM    MCV 95.2 01/24/2023 08:36 AM    RDW 13.4 01/24/2023 08:36 AM     01/24/2023 08:36 AM       CMP:   Lab Results   Component Value Date/Time     01/24/2023 08:36 AM    K 4.5 01/24/2023 08:36 AM    K 3.9 10/14/2022 02:10 AM     01/24/2023 08:36 AM    CO2 27 01/24/2023 08:36 AM    BUN 8 01/24/2023 08:36 AM    CREATININE 0.7 01/24/2023 08:36 AM    GFRAA >60 10/14/2022 02:10 AM    AGRATIO 1.4 01/24/2023 08:36 AM    LABGLOM >60 01/24/2023 08:36 AM    GLUCOSE 115 01/24/2023 08:36 AM    PROT 7.1 01/24/2023 08:36 AM    CALCIUM 9.4 01/24/2023 08:36 AM    BILITOT 0.4 01/24/2023 08:36 AM    ALKPHOS 210 01/24/2023 08:36 AM    AST 24 01/24/2023 08:36 AM    ALT 29 01/24/2023 08:36 AM       POC Tests: No results for input(s): POCGLU, POCNA, POCK, POCCL, POCBUN, POCHEMO, POCHCT in the last 72 hours.     Coags:   Lab Results   Component Value Date/Time    PROTIME 14.1 10/13/2022 08:44 AM    INR 1.11 10/13/2022 08:44 AM    APTT 29.8 10/13/2022 08:44 AM       HCG (If Applicable): No results found for: PREGTESTUR, PREGSERUM, HCG, HCGQUANT     ABGs: No results found for: PHART, PO2ART, FRO5QIC, NLF0HPV, BEART, C0ATAFUJ     Type & Screen (If Applicable):  No results found for: LABABO, LABRH    Drug/Infectious Status (If Applicable):  No results found for: HIV, HEPCAB    COVID-19 Screening (If Applicable):   Lab Results   Component Value Date/Time    COVID19 Not Detected 10/12/2022 03:24 PM           Anesthesia Evaluation   history of anesthetic complications:   Airway: Mallampati: II  TM distance: >3 FB   Neck ROM: full  Mouth opening: > = 3 FB   Dental:          Pulmonary:   (+) shortness of breath:                             Cardiovascular:    (+) CAD:, ROLLINS:,         Rhythm: regular  Rate: normal                    Neuro/Psych:   (+) CVA:, TIA, psychiatric history:            GI/Hepatic/Renal:   (+) GERD:,           Endo/Other:                     Abdominal:             Vascular: Other Findings:           Anesthesia Plan      MAC     ASA 2       Induction: intravenous. Anesthetic plan and risks discussed with patient. Plan discussed with CRNA.                     Neetu Aguilar MD   1/30/2023

## 2023-01-30 NOTE — PROGRESS NOTES
Ambulatory Surgery/Procedure Discharge Note    Vitals:    01/30/23 1325   BP: 112/61   Pulse: 66   Resp: 16   Temp: 97.4 °F (36.3 °C)   SpO2: 100%     Phase 2 endo   In: 1050 [P.O.:50; I.V.:1000]  Out: -     Restroom use offered before discharge. Yes    Pain assessment:  none  Pain Level: 0/10    Reviewed d/c instructions with son both pt and son verbalized their understanding. Dr. Tripp spoke  with pt /mom d/c iv up at [facility]  [phone]  Patient discharged to home/self care.  Patient discharged via wheel chair by transporter to waiting family/S.O.       1/30/2023

## 2023-02-03 ENCOUNTER — CLINICAL DOCUMENTATION (OUTPATIENT)
Dept: SPIRITUAL SERVICES | Age: 58
End: 2023-02-03

## 2023-02-03 NOTE — ACP (ADVANCE CARE PLANNING)
Advance Care Planning   Ambulatory ACP Specialist Patient Outreach    Date:  2/3/2023  ACP Specialist:  CORTNEY Cruz    Outreach call to patient in follow-up to ACP Specialist referral from: Clemencia Woodson      Date Referral Received: 10/14/22    Today's Outreach:  [] First   [x] Second  [] Third                               Third outreach made by []  phone  [] email [x]   MyChart     Intervention:  [] Spoke with Patient  [x] Left VM requesting return call      Outcome: ACP Specialist left message for patient. ACP Referral was placed by  when patient was inpatient. Staff sent a [employer] message. Referral will be closed unless patient returns call. Next Step:   [] ACP scheduled conversation  [] Outreach again in one week               [] Email / Mail ACP Info Sheets  [] Email / Mail Advance Directive            [x] Close Referral. Routing closure to referring provider/staff and to ACP Specialist . [] Closure Letter mailed to Patient with Invitation to Contact ACP Specialist if/when ready.     Thank you for this referral.

## 2023-10-26 ENCOUNTER — HOSPITAL ENCOUNTER (OUTPATIENT)
Age: 58
Discharge: HOME | End: 2023-10-26
Attending: INTERNAL MEDICINE
Payer: MEDICARE

## 2023-10-26 ENCOUNTER — HOSPITAL ENCOUNTER (OUTPATIENT)
Dept: ULTRASOUND IMAGING | Age: 58
Discharge: HOME | End: 2023-10-26
Attending: INTERNAL MEDICINE
Payer: MEDICARE

## 2023-10-26 DIAGNOSIS — Z82.49 FAMILY HISTORY OF ABDOMINAL AORTIC ANEURYSM (AAA): ICD-10-CM

## 2023-10-26 DIAGNOSIS — I71.43 INFRARENAL ABDOMINAL AORTIC ANEURYSM (AAA) WITHOUT RUPTURE (HCC): ICD-10-CM

## 2023-10-26 LAB
ALBUMIN SERPL-MCNC: 4.1 G/DL (ref 3.4–5)
ALBUMIN/GLOB SERPL: 1.3 {RATIO} (ref 1.1–2.2)
ALP SERPL-CCNC: 197 U/L (ref 40–129)
ALT SERPL-CCNC: 18 U/L (ref 10–40)
ANION GAP SERPL CALCULATED.3IONS-SCNC: 9 MMOL/L (ref 3–16)
AST SERPL-CCNC: 24 U/L (ref 15–37)
BILIRUB SERPL-MCNC: 0.4 MG/DL (ref 0–1)
BUN SERPL-MCNC: 8 MG/DL (ref 7–20)
CALCIUM SERPL-MCNC: 9.7 MG/DL (ref 8.3–10.6)
CHLORIDE SERPL-SCNC: 98 MMOL/L (ref 99–110)
CHOLEST SERPL-MCNC: 118 MG/DL (ref 0–199)
CO2 SERPL-SCNC: 27 MMOL/L (ref 21–32)
CREAT SERPL-MCNC: 0.8 MG/DL (ref 0.6–1.1)
DEPRECATED RDW RBC AUTO: 14 % (ref 12.4–15.4)
GFR SERPLBLD CREATININE-BSD FMLA CKD-EPI: >60 ML/MIN/{1.73_M2}
GLUCOSE P FAST SERPL-MCNC: 96 MG/DL (ref 70–99)
HCT VFR BLD AUTO: 46.8 % (ref 36–48)
HDLC SERPL-MCNC: 45 MG/DL (ref 40–60)
HGB BLD-MCNC: 15.5 G/DL (ref 12–16)
LDL CHOLESTEROL CALCULATED: 55 MG/DL
MCH RBC QN AUTO: 30.7 PG (ref 26–34)
MCHC RBC AUTO-ENTMCNC: 33.2 G/DL (ref 31–36)
MCV RBC AUTO: 92.5 FL (ref 80–100)
PLATELET # BLD AUTO: 269 K/UL (ref 135–450)
PMV BLD AUTO: 8.9 FL (ref 5–10.5)
POTASSIUM SERPL-SCNC: 4.3 MMOL/L (ref 3.5–5.1)
PROT SERPL-MCNC: 7.2 G/DL (ref 6.4–8.2)
RBC # BLD AUTO: 5.06 M/UL (ref 4–5.2)
SODIUM SERPL-SCNC: 134 MMOL/L (ref 136–145)
TRIGL SERPL-MCNC: 88 MG/DL (ref 0–150)
TSH SERPL DL<=0.005 MIU/L-ACNC: 2.26 UIU/ML (ref 0.27–4.2)
VIT B12 SERPL-MCNC: 343 PG/ML (ref 211–911)
VLDLC SERPL CALC-MCNC: 18 MG/DL
WBC # BLD AUTO: 8.9 K/UL (ref 4–11)

## 2023-10-26 PROCEDURE — 84443 ASSAY THYROID STIM HORMONE: CPT

## 2023-10-26 PROCEDURE — 85027 COMPLETE CBC AUTOMATED: CPT

## 2023-10-26 PROCEDURE — 36415 COLL VENOUS BLD VENIPUNCTURE: CPT

## 2023-10-26 PROCEDURE — 76775 US EXAM ABDO BACK WALL LIM: CPT

## 2023-10-26 PROCEDURE — 82607 VITAMIN B-12: CPT

## 2023-10-26 PROCEDURE — 83036 HEMOGLOBIN GLYCOSYLATED A1C: CPT

## 2023-10-26 PROCEDURE — 80061 LIPID PANEL: CPT

## 2023-10-26 PROCEDURE — 80053 COMPREHEN METABOLIC PANEL: CPT

## 2023-10-27 LAB
EST. AVERAGE GLUCOSE BLD GHB EST-MCNC: 137 MG/DL
HBA1C MFR BLD: 6.4 %

## 2023-11-10 ENCOUNTER — HOSPITAL ENCOUNTER (OUTPATIENT)
Dept: ULTRASOUND IMAGING | Age: 58
Discharge: HOME | End: 2023-11-10
Attending: INTERNAL MEDICINE
Payer: MEDICARE

## 2023-11-10 ENCOUNTER — HOSPITAL ENCOUNTER (OUTPATIENT)
Dept: GENERAL RADIOLOGY | Age: 58
Discharge: HOME | End: 2023-11-10
Attending: INTERNAL MEDICINE
Payer: MEDICARE

## 2023-11-10 DIAGNOSIS — R79.89 ABNORMAL LFTS: ICD-10-CM

## 2023-11-10 DIAGNOSIS — R94.5 NONSPECIFIC ABNORMAL RESULTS OF LIVER FUNCTION STUDY: ICD-10-CM

## 2023-11-10 DIAGNOSIS — J44.9 VANISHING LUNG (HCC): ICD-10-CM

## 2023-11-10 DIAGNOSIS — R06.02 SHORTNESS OF BREATH: ICD-10-CM

## 2023-11-10 DIAGNOSIS — R05.3 CHRONIC COUGH: ICD-10-CM

## 2023-11-10 DIAGNOSIS — R06.00 DYSPNEA, UNSPECIFIED TYPE: ICD-10-CM

## 2023-11-10 PROCEDURE — 71046 X-RAY EXAM CHEST 2 VIEWS: CPT

## 2023-11-10 PROCEDURE — 76705 ECHO EXAM OF ABDOMEN: CPT

## 2023-12-08 ENCOUNTER — HOSPITAL ENCOUNTER (OUTPATIENT)
Dept: PULMONOLOGY | Age: 58
Discharge: HOME | End: 2023-12-08
Attending: INTERNAL MEDICINE
Payer: MEDICARE

## 2023-12-08 VITALS — OXYGEN SATURATION: 99 %

## 2023-12-08 DIAGNOSIS — R06.00 DYSPNEA, UNSPECIFIED TYPE: ICD-10-CM

## 2023-12-08 DIAGNOSIS — J44.9 VANISHING LUNG (HCC): ICD-10-CM

## 2023-12-08 DIAGNOSIS — R94.5 NONSPECIFIC ABNORMAL RESULTS OF LIVER FUNCTION STUDY: ICD-10-CM

## 2023-12-08 DIAGNOSIS — R79.89 ABNORMAL LFTS: ICD-10-CM

## 2023-12-08 DIAGNOSIS — R06.02 SHORTNESS OF BREATH: ICD-10-CM

## 2023-12-08 DIAGNOSIS — R05.3 CHRONIC COUGH: ICD-10-CM

## 2023-12-08 LAB
DLCO %PRED: 51 %
DLCO PRED: NORMAL
DLCO/VA %PRED: NORMAL
DLCO/VA PRED: NORMAL
DLCO/VA: NORMAL
DLCO: NORMAL
EXPIRATORY TIME-POST: NORMAL
EXPIRATORY TIME: NORMAL
FEF 25-75% %CHNG: NORMAL
FEF 25-75% %PRED-POST: NORMAL
FEF 25-75% %PRED-PRE: NORMAL
FEF 25-75% PRED: NORMAL
FEF 25-75%-POST: NORMAL
FEF 25-75%-PRE: NORMAL
FEV1 %PRED-POST: 86 %
FEV1 %PRED-PRE: 84 %
FEV1 PRED: NORMAL
FEV1-POST: NORMAL
FEV1-PRE: NORMAL
FEV1/FVC %PRED-POST: NORMAL
FEV1/FVC %PRED-PRE: NORMAL
FEV1/FVC PRED: NORMAL
FEV1/FVC-POST: 71 %
FEV1/FVC-PRE: 73 %
FVC %PRED-POST: NORMAL
FVC %PRED-PRE: NORMAL
FVC PRED: NORMAL
FVC-POST: NORMAL
FVC-PRE: NORMAL
GAW %PRED: NORMAL
GAW PRED: NORMAL
GAW: NORMAL
IC %PRED: NORMAL
IC PRED: NORMAL
IC: NORMAL
MEP: NORMAL
MIP: NORMAL
MVV %PRED-PRE: NORMAL
MVV PRED: NORMAL
MVV-PRE: NORMAL
PEF %PRED-POST: NORMAL
PEF %PRED-PRE: NORMAL
PEF PRED: NORMAL
PEF%CHNG: NORMAL
PEF-POST: NORMAL
PEF-PRE: NORMAL
RAW %PRED: NORMAL
RAW PRED: NORMAL
RAW: NORMAL
RV %PRED: NORMAL
RV PRED: NORMAL
RV: NORMAL
SVC %PRED: NORMAL
SVC PRED: NORMAL
SVC: NORMAL
TLC %PRED: 90 %
TLC PRED: NORMAL
TLC: NORMAL
VA %PRED: NORMAL
VA PRED: NORMAL
VA: NORMAL
VTG %PRED: NORMAL
VTG PRED: NORMAL
VTG: NORMAL

## 2023-12-08 PROCEDURE — 94726 PLETHYSMOGRAPHY LUNG VOLUMES: CPT

## 2023-12-08 PROCEDURE — 6370000000 HC RX 637 (ALT 250 FOR IP): Performed by: INTERNAL MEDICINE

## 2023-12-08 PROCEDURE — 94729 DIFFUSING CAPACITY: CPT

## 2023-12-08 PROCEDURE — 94060 EVALUATION OF WHEEZING: CPT

## 2023-12-08 PROCEDURE — 94760 N-INVAS EAR/PLS OXIMETRY 1: CPT

## 2023-12-08 PROCEDURE — 94640 AIRWAY INHALATION TREATMENT: CPT

## 2023-12-08 RX ORDER — ALBUTEROL SULFATE 90 UG/1
4 AEROSOL, METERED RESPIRATORY (INHALATION) ONCE
Status: COMPLETED | OUTPATIENT
Start: 2023-12-08 | End: 2023-12-08

## 2023-12-08 RX ADMIN — Medication 4 PUFF: at 11:00

## 2023-12-08 ASSESSMENT — PULMONARY FUNCTION TESTS
FEV1/FVC_POST: 71
FEV1_PERCENT_PREDICTED_POST: 86
FEV1_PERCENT_PREDICTED_PRE: 84
FEV1/FVC_PRE: 73

## 2023-12-12 ASSESSMENT — PULMONARY FUNCTION TESTS
FEV1/FVC_POST: 71
FEV1_PERCENT_PREDICTED_POST: 86
FEV1_PERCENT_PREDICTED_PRE: 84
FEV1/FVC_PRE: 73

## 2023-12-28 ENCOUNTER — HOSPITAL ENCOUNTER (OUTPATIENT)
Age: 58
Discharge: HOME | End: 2023-12-28
Payer: MEDICARE

## 2023-12-28 LAB
ANION GAP SERPL CALCULATED.3IONS-SCNC: 7 MMOL/L (ref 3–16)
APTT BLD: 26.7 SEC (ref 22.7–35.9)
BASOPHILS # BLD: 0.1 K/UL (ref 0–0.2)
BASOPHILS NFR BLD: 0.9 %
BUN SERPL-MCNC: 7 MG/DL (ref 7–20)
CALCIUM SERPL-MCNC: 8.8 MG/DL (ref 8.3–10.6)
CHLORIDE SERPL-SCNC: 100 MMOL/L (ref 99–110)
CO2 SERPL-SCNC: 29 MMOL/L (ref 21–32)
CREAT SERPL-MCNC: 0.7 MG/DL (ref 0.6–1.1)
DEPRECATED RDW RBC AUTO: 14.3 % (ref 12.4–15.4)
EOSINOPHIL # BLD: 0.1 K/UL (ref 0–0.6)
EOSINOPHIL NFR BLD: 0.8 %
GFR SERPLBLD CREATININE-BSD FMLA CKD-EPI: >60 ML/MIN/{1.73_M2}
GLUCOSE SERPL-MCNC: 116 MG/DL (ref 70–99)
HCT VFR BLD AUTO: 44.6 % (ref 36–48)
HGB BLD-MCNC: 14.7 G/DL (ref 12–16)
INR PPP: 0.89 (ref 0.84–1.16)
LYMPHOCYTES # BLD: 2.7 K/UL (ref 1–5.1)
LYMPHOCYTES NFR BLD: 28.4 %
MCH RBC QN AUTO: 30.7 PG (ref 26–34)
MCHC RBC AUTO-ENTMCNC: 32.9 G/DL (ref 31–36)
MCV RBC AUTO: 93.1 FL (ref 80–100)
MONOCYTES # BLD: 0.8 K/UL (ref 0–1.3)
MONOCYTES NFR BLD: 7.9 %
NEUTROPHILS # BLD: 5.9 K/UL (ref 1.7–7.7)
NEUTROPHILS NFR BLD: 62 %
PLATELET # BLD AUTO: 303 K/UL (ref 135–450)
PMV BLD AUTO: 8.6 FL (ref 5–10.5)
POTASSIUM SERPL-SCNC: 4 MMOL/L (ref 3.5–5.1)
PROTHROMBIN TIME: 12.1 SEC (ref 11.5–14.8)
RBC # BLD AUTO: 4.79 M/UL (ref 4–5.2)
SODIUM SERPL-SCNC: 136 MMOL/L (ref 136–145)
WBC # BLD AUTO: 9.5 K/UL (ref 4–11)

## 2023-12-28 PROCEDURE — 85730 THROMBOPLASTIN TIME PARTIAL: CPT

## 2023-12-28 PROCEDURE — 85025 COMPLETE CBC W/AUTO DIFF WBC: CPT

## 2023-12-28 PROCEDURE — 83036 HEMOGLOBIN GLYCOSYLATED A1C: CPT

## 2023-12-28 PROCEDURE — 85610 PROTHROMBIN TIME: CPT

## 2023-12-28 PROCEDURE — 36415 COLL VENOUS BLD VENIPUNCTURE: CPT

## 2023-12-28 PROCEDURE — 80048 BASIC METABOLIC PNL TOTAL CA: CPT

## 2023-12-29 LAB
EST. AVERAGE GLUCOSE BLD GHB EST-MCNC: 134.1 MG/DL
HBA1C MFR BLD: 6.3 %

## 2024-01-01 NOTE — TELEPHONE ENCOUNTER
Spoke with Chance Abrams, relayed Cardiac CTA results, pt v/u. She would like Rx sent to [address] [place] [address]. 6 months to 35 months (need ONE to TWO doses)...

## 2024-06-24 ENCOUNTER — APPOINTMENT (OUTPATIENT)
Dept: GENERAL RADIOLOGY | Age: 59
End: 2024-06-24
Payer: MEDICARE

## 2024-06-24 ENCOUNTER — HOSPITAL ENCOUNTER (EMERGENCY)
Age: 59
Discharge: HOME | End: 2024-06-24
Payer: MEDICARE

## 2024-06-24 VITALS
TEMPERATURE: 97.3 F | DIASTOLIC BLOOD PRESSURE: 88 MMHG | SYSTOLIC BLOOD PRESSURE: 138 MMHG | HEART RATE: 78 BPM | BODY MASS INDEX: 22.5 KG/M2 | RESPIRATION RATE: 18 BRPM | HEIGHT: 66 IN | OXYGEN SATURATION: 99 % | WEIGHT: 140 LBS

## 2024-06-24 DIAGNOSIS — S92.425A CLOSED NONDISPLACED FRACTURE OF DISTAL PHALANX OF LEFT GREAT TOE, INITIAL ENCOUNTER: Primary | ICD-10-CM

## 2024-06-24 PROCEDURE — 6370000000 HC RX 637 (ALT 250 FOR IP): Performed by: NURSE PRACTITIONER

## 2024-06-24 PROCEDURE — 73630 X-RAY EXAM OF FOOT: CPT

## 2024-06-24 PROCEDURE — 99283 EMERGENCY DEPT VISIT LOW MDM: CPT

## 2024-06-24 RX ORDER — IBUPROFEN 600 MG/1
600 TABLET ORAL ONCE
Status: COMPLETED | OUTPATIENT
Start: 2024-06-24 | End: 2024-06-24

## 2024-06-24 RX ORDER — ACETAMINOPHEN 325 MG/1
650 TABLET ORAL ONCE
Status: COMPLETED | OUTPATIENT
Start: 2024-06-24 | End: 2024-06-24

## 2024-06-24 RX ORDER — HYDROCODONE BITARTRATE AND ACETAMINOPHEN 5; 325 MG/1; MG/1
1 TABLET ORAL EVERY 6 HOURS PRN
Qty: 12 TABLET | Refills: 0 | Status: SHIPPED | OUTPATIENT
Start: 2024-06-24 | End: 2024-06-27

## 2024-06-24 RX ADMIN — ACETAMINOPHEN 650 MG: 325 TABLET ORAL at 11:28

## 2024-06-24 RX ADMIN — IBUPROFEN 600 MG: 600 TABLET, FILM COATED ORAL at 11:28

## 2024-06-24 ASSESSMENT — PAIN DESCRIPTION - FREQUENCY: FREQUENCY: CONTINUOUS

## 2024-06-24 ASSESSMENT — PAIN DESCRIPTION - PAIN TYPE: TYPE: ACUTE PAIN

## 2024-06-24 ASSESSMENT — PAIN DESCRIPTION - ORIENTATION: ORIENTATION: LEFT

## 2024-06-24 ASSESSMENT — LIFESTYLE VARIABLES
HOW OFTEN DO YOU HAVE A DRINK CONTAINING ALCOHOL: NEVER
HOW MANY STANDARD DRINKS CONTAINING ALCOHOL DO YOU HAVE ON A TYPICAL DAY: PATIENT DOES NOT DRINK

## 2024-06-24 ASSESSMENT — PAIN - FUNCTIONAL ASSESSMENT
PAIN_FUNCTIONAL_ASSESSMENT: 0-10
PAIN_FUNCTIONAL_ASSESSMENT: 0-10

## 2024-06-24 ASSESSMENT — PAIN SCALES - GENERAL
PAINLEVEL_OUTOF10: 9
PAINLEVEL_OUTOF10: 4

## 2024-06-24 ASSESSMENT — PAIN DESCRIPTION - LOCATION: LOCATION: FOOT

## 2024-06-24 ASSESSMENT — PAIN DESCRIPTION - DESCRIPTORS: DESCRIPTORS: BURNING

## 2024-06-30 NOTE — ED PROVIDER NOTES
[facility] ED  EMERGENCY DEPARTMENT ENCOUNTER        Pt Name: Gabbie Cavazos  MRN: [id number]  Birthdate 1965  Date of evaluation: 6/24/2024  Provider: VERN Murry - BENITO  PCP: Henrry Mcclendon MD  Note Started: 9:05 AM EDT 6/30/24      DALE. I have evaluated this patient.        CHIEF COMPLAINT       Chief Complaint   Patient presents with    Foot Injury     Dropped impact wrench on left foot yesterday        HISTORY OF PRESENT ILLNESS: 1 or more Elements     History From: Patient    Limitations to history : None    Social Determinants Significantly Affecting Health : None    Chief Complaint: Left foot pain    Gabbie Cavazos is a 58 y.o. female who presents to the emergency department today after she dropped a wrench on her left foot.  Patient states that she injured the great toe.  No nail injury.  No injury to her ankle or shin.  States that the injury occurred yesterday.  Denies any open injury.  Was wearing a shoe when this occurred.    Nursing Notes were all reviewed and agreed with or any disagreements were addressed in the HPI.    REVIEW OF SYSTEMS :      Review of Systems    Positives and Pertinent negatives as per HPI.     SURGICAL HISTORY     Past Surgical History:   Procedure Laterality Date    BACK SURGERY  1997     lumbar x2    CHOLECYSTECTOMY      COLONOSCOPY N/A 1/30/2023    COLONOSCOPY POLYPECTOMY SNARE/COLD BIOPSY performed by Alon Bartholomew MD at [facility] ENDOSCOPY    HAND SURGERY Bilateral     thumb joint replacement    HYSTERECTOMY (CERVIX STATUS UNKNOWN)      KNEE ARTHROSCOPY Right     x2    KNEE SURGERY Right 11/21/2017    RIGHT TOTAL KNEE REPLACEMENT     POLYPECTOMY Bilateral 10/03/2022    vocal cords       CURRENTMEDICATIONS       Discharge Medication List as of 6/24/2024 12:20 PM        CONTINUE these medications which have NOT CHANGED    Details   FLUoxetine (PROZAC) 20 MG capsule Take 20 mg by mouth dailyHistorical Med      atorvastatin (LIPITOR) 40 MG

## 2024-07-01 ENCOUNTER — TELEPHONE (OUTPATIENT)
Dept: ORTHOPEDIC SURGERY | Age: 59
End: 2024-07-01

## (undated) DEVICE — TRAP SPEC RETRV CLR PLAS POLYP IN LN SUCT QUIK CTCH

## (undated) DEVICE — CANNULA SAMP CO2 AD GRN 7FT CO2 AND 7FT O2 TBNG UNIV CONN

## (undated) DEVICE — SNARE COLD DIAMOND 10MM THIN